# Patient Record
Sex: MALE | Race: WHITE | URBAN - METROPOLITAN AREA
[De-identification: names, ages, dates, MRNs, and addresses within clinical notes are randomized per-mention and may not be internally consistent; named-entity substitution may affect disease eponyms.]

---

## 2018-05-22 ENCOUNTER — TRANSFERRED RECORDS (OUTPATIENT)
Dept: HEALTH INFORMATION MANAGEMENT | Facility: CLINIC | Age: 48
End: 2018-05-22

## 2018-07-03 ENCOUNTER — TRANSFERRED RECORDS (OUTPATIENT)
Dept: HEALTH INFORMATION MANAGEMENT | Facility: CLINIC | Age: 48
End: 2018-07-03

## 2018-07-05 ENCOUNTER — TELEPHONE (OUTPATIENT)
Dept: UROLOGY | Facility: CLINIC | Age: 48
End: 2018-07-05

## 2018-07-05 DIAGNOSIS — N35.919 URETHRAL STRICTURE: Primary | ICD-10-CM

## 2018-07-05 NOTE — TELEPHONE ENCOUNTER
Placed orders for vcug and rug and sent to scheduling to schedule. Rocío Jimenez LPN Staff Nurse

## 2018-07-09 ENCOUNTER — PRE VISIT (OUTPATIENT)
Dept: UROLOGY | Facility: CLINIC | Age: 48
End: 2018-07-09

## 2018-07-09 NOTE — TELEPHONE ENCOUNTER
Patient with history of meatal stenosis coming in for consult. Patient scheduled for imaging prior. Patient chart reviewed, no need for call, all records available and ready for appointment.

## 2018-07-13 ENCOUNTER — HOSPITAL ENCOUNTER (OUTPATIENT)
Dept: GENERAL RADIOLOGY | Facility: CLINIC | Age: 48
End: 2018-07-13
Attending: UROLOGY
Payer: COMMERCIAL

## 2018-07-13 ENCOUNTER — HOSPITAL ENCOUNTER (OUTPATIENT)
Dept: GENERAL RADIOLOGY | Facility: CLINIC | Age: 48
Discharge: HOME | End: 2018-07-13
Attending: UROLOGY | Admitting: UROLOGY
Payer: COMMERCIAL

## 2018-07-13 ENCOUNTER — TELEPHONE (OUTPATIENT)
Dept: UROLOGY | Facility: CLINIC | Age: 48
End: 2018-07-13

## 2018-07-13 ENCOUNTER — OFFICE VISIT (OUTPATIENT)
Dept: UROLOGY | Facility: CLINIC | Age: 48
End: 2018-07-13
Payer: COMMERCIAL

## 2018-07-13 ENCOUNTER — ALLIED HEALTH/NURSE VISIT (OUTPATIENT)
Dept: UROLOGY | Facility: CLINIC | Age: 48
End: 2018-07-13
Payer: COMMERCIAL

## 2018-07-13 VITALS
DIASTOLIC BLOOD PRESSURE: 87 MMHG | HEART RATE: 73 BPM | WEIGHT: 260 LBS | BODY MASS INDEX: 38.51 KG/M2 | HEIGHT: 69 IN | SYSTOLIC BLOOD PRESSURE: 135 MMHG

## 2018-07-13 DIAGNOSIS — N35.013 POST-TRAUMATIC STRICTURE OF ANTERIOR URETHRA: Primary | ICD-10-CM

## 2018-07-13 DIAGNOSIS — L90.0 LICHEN SCLEROSUS: ICD-10-CM

## 2018-07-13 DIAGNOSIS — N35.919 URETHRAL STRICTURE: ICD-10-CM

## 2018-07-13 DIAGNOSIS — N35.919 URETHRAL STRICTURE: Primary | ICD-10-CM

## 2018-07-13 PROCEDURE — 51610 INJECTION FOR BLADDER X-RAY: CPT

## 2018-07-13 PROCEDURE — 25000125 ZZHC RX 250: Performed by: RADIOLOGY

## 2018-07-13 PROCEDURE — 74455 X-RAY URETHRA/BLADDER: CPT

## 2018-07-13 PROCEDURE — 25500064 ZZH RX 255 OP 636: Performed by: RADIOLOGY

## 2018-07-13 RX ORDER — SERTRALINE HYDROCHLORIDE 100 MG/1
150 TABLET, FILM COATED ORAL DAILY
COMMUNITY
Start: 2017-08-10

## 2018-07-13 RX ORDER — MORPHINE SULFATE 30 MG/1
30 TABLET, FILM COATED, EXTENDED RELEASE ORAL
COMMUNITY
Start: 2018-05-19

## 2018-07-13 RX ORDER — BETAMETHASONE DIPROPIONATE 0.5 MG/G
CREAM TOPICAL
COMMUNITY
Start: 2018-07-03

## 2018-07-13 RX ORDER — IOPAMIDOL 510 MG/ML
500 INJECTION, SOLUTION INTRAVASCULAR ONCE
Status: COMPLETED | OUTPATIENT
Start: 2018-07-13 | End: 2018-07-13

## 2018-07-13 RX ORDER — ALBUTEROL SULFATE 90 UG/1
2 AEROSOL, METERED RESPIRATORY (INHALATION)
COMMUNITY
Start: 2018-02-23

## 2018-07-13 RX ORDER — CEFAZOLIN SODIUM 1 G/50ML
1 INJECTION, SOLUTION INTRAVENOUS SEE ADMIN INSTRUCTIONS
Status: CANCELLED | OUTPATIENT
Start: 2018-07-13

## 2018-07-13 RX ORDER — TRAZODONE HYDROCHLORIDE 50 MG/1
50-100 TABLET, FILM COATED ORAL
COMMUNITY
Start: 2017-08-22

## 2018-07-13 RX ADMIN — LIDOCAINE HYDROCHLORIDE: 20 JELLY TOPICAL at 09:33

## 2018-07-13 RX ADMIN — IOPAMIDOL 150 ML: 510 INJECTION, SOLUTION INTRAVASCULAR at 09:32

## 2018-07-13 ASSESSMENT — ENCOUNTER SYMPTOMS
FLANK PAIN: 0
DIFFICULTY URINATING: 1
DYSURIA: 1
HEMATURIA: 0

## 2018-07-13 ASSESSMENT — PAIN SCALES - GENERAL: PAINLEVEL: NO PAIN (0)

## 2018-07-13 NOTE — PROGRESS NOTES
Urology Consult History and Physical    Name: Bakari White    MRN: [id number]   YOB: 1970         CHIEF COMPLAINT:  Urethral stricture.  HISTORY OF PRESENT ILLNESS:  Mr. White is a 48 year old-year-old man seen in consultation from Dr. Gutierrez for urethral stricture. Symptoms include slow stream and sense of incomplete emptying. He has had prior treatments for his urethral stricture. He underwent dilation and meatotomy 2-3 times. He has been dealing with this issue for 20-25 years.  He does have a history of self dilation. He currently does not perform self dilation. He does not currently have a catheter.  Etiology:  He denies a history of sexually transmitted diseases. He denies a history of straddle injury. He denies a history of pelvic fracture.   REVIEW OF QUESTIONNAIRES:  Questionnaires reviewed. See flowsheet for details.  Past Medical History:   Diagnosis Date     Pain management contract agreement      History reviewed. No pertinent surgical history.  Current Outpatient Prescriptions   Medication     albuterol (PROAIR HFA/PROVENTIL HFA/VENTOLIN HFA) 108 (90 Base) MCG/ACT Inhaler     augmented betamethasone dipropionate (DIPROLENE-AF) 0.05 % cream     morphine (MS CONTIN) 30 MG 12 hr tablet     sertraline (ZOLOFT) 100 MG tablet     tiZANidine (ZANAFLEX) 4 MG tablet     traZODone (DESYREL) 50 MG tablet     No current facility-administered medications for this visit.      Allergies as of 07/13/2018 - Denzel as Reviewed 07/13/2018   Allergen Reaction Noted     Etodolac  01/16/2012       History reviewed. No pertinent family history.  Social History     Social History     Marital status:      Spouse name: N/A     Number of children: N/A     Years of education: N/A     Occupational History     Not on file.     Social History Main Topics     Smoking status: Current Every Day Smoker     Smokeless tobacco: Never Used     Alcohol use Not on file     Drug use: Not on file     Sexual activity: Not  on file     Other Topics Concern     Not on file     Social History Narrative     No narrative on file     REVIEW OF SYSTEMS:    Skin: negative, negative for, as above, acne, rash, scaling, bruising, lumps or bumps, hair changes  Eyes: negative, negative for, double vision, glaucoma, cataracts, eye pain, color blindness, glasses, contacts, photophobia  Ears/Nose/Throat: negative, negative for, nasal congestion, purulent rhinorrhea, sneezing, postnasal drainage, hearing loss, deafness, tinnitus, vertigo, epistaxis, deviated septum  Respiratory: No shortness of breath, dyspnea on exertion, cough, or hemoptysis  Cardiovascular: negative, negative for, palpitations, tachycardia, chest pain, paroxysmal nocturnal dyspnea, orthopnea and lower extremity edema  Gastrointestinal: negative for, poor appetite, dysphagia, nausea, vomiting, heartburn, dyspepsia, reflux and hematemesis  Genitourinary: positive for  frequency, hesitancy and decreased urinary stream  Musculoskeletal: negative, negative for, fracture, back pain, neck pain, arthritis, joint swelling, joint stiffness, gout and fibromyalgia  Neurologic: negative, negative for, migraine headaches, headaches, syncope, stroke, seizures, paralysis, local weakness, numbness or tingling of hands and numbness or tingling of feet  Psychiatric: negative, negative for, sleep disturbance, feeling anxious, anxiety, nervous breakdown, depression, depression stable, thoughts of self-harm, thoughts of hurting someone else and agitation  Hematologic/Lymphatic/Immunologic: negative, negative for, as above, allergies, anemia, bleeding disorder, chills, fever and hay fever  Endocrine: negative, negative for, as above, thyroid disorder, goiter, cold intolerance, heat intolerance and hot flashes  The remainder of the 14-point review of systems is negative.  PHYSICAL EXAMINATION:  General:  Well-dressed, well-nourished man in no acute distress.  Vitals: /87  Pulse 73  Ht 1.753 m (5'  "9\")  Wt 117.9 kg (260 lb)  BMI 38.4 kg/m2 Estimated body mass index is 38.4 kg/(m^2) as calculated from the following:    Height as of this encounter: 1.753 m (5' 9\").    Weight as of this encounter: 117.9 kg (260 lb).    Eyes: anicteric, EOMI  Lymph: No cervical, supraclavicular or axillary lymphadenopathy  Lungs:  No respiratory distress.  Heart:  Regular rate and rhythm.     Abdomen:  mildly obese, soft, nontender, without masses.  There are not surgical scars.    :  Penis is circumcised. Meatal stenosis is present, penile plaques are absent. Skin lesions are present. There are some whitish plaques along the corona and the glans.  There is not firmness along the course of the entire urethra urethra.  Testes are 10 mL bilaterally without masses.    Musculoskeletal:  Motor strength is excellent throughout.     Neurologic:  Grossly nonfocal.    Back: Flanks are nontender.  REVIEW OF IMAGING STUDIES:  Retrograde urethrogram and voiding cystourethrogram were performed earlier and the images were independently interpreted by me and are reviewed with the patient.  These demonstrate a 7 cm  Distal and proximal penile urethral stricture that is not obliterative.  The bladder contour is normal without diverticulae.      ASSESSMENT/PLAN:  A 48 year old-year-old gentleman with penile and meatal urethral stricture refractory to minimally invasive management. On exam, this appears to be related to lichen sclerosis. He has been managed by prior urologists and is referred to our center for advanced urethral reconstruction options.  Risks, benefits, and alternatives of repeat urethrotomy versus urethroplasty were described.  He wishes to proceed with urethroplasty.  He understands the risks to include but not be limited to bleeding, infection, penile pain or numbness, scrotal pain or numbness, lower extremity neuropathy, change in erectile function, change in ejaculatory function, and need for additional procedures. He " understands the success rate of urethroplasty to be about 95% for anastomotic and 85% for augmentation procedures for bulbar urethral strictures.  I discussed a lower success rate in the setting of lichen sclerosis and a penile urethral stricture with this patient.  Additional Risk Factors in this case/surgery: Lichen sclerosis. Length of stricture. Obesity. History of self catheterization. Location of stricture.  I discussed with him the possible approaches for this patient which include 1) dilation and CIC to keep it open 2) perineal urethrostomy and 3) urethroplasty. He is thoughtful and given that he has not had prior surgical reconstruction, he desires urethroplasty. I discussed this could be done in a one stage (dorsal onlay buccal) or two stage fashion. I discussed that I would recommend a one stage approach for decreased morbidity (no need for 6 months in between stages) and some studies suggesting improved outcomes.   We offered urethroplasty. We would perform this using a penile skin incision in a dorsal onlay fashion. We discussed that he would need a suprapubic tube, which would be placed at the time of urethroplasty. His urethral yi would be removed in 10-14 days. Then at 3 weeks, a VCUG would be performed. If it looked good, then his SPT would be removed at that time.  He does have a history of chronic neck pain and takes morphine daily.  Orders placed.  Thank for the opportunity to care for this patient.  Deep Gloria MD  Genitourinary Reconstructive Urology

## 2018-07-13 NOTE — PROGRESS NOTES
Pre Op Teaching Flowsheet       Pre and Post op Patient Education  Relevant Diagnosis:  Post-traumatic stricture of anterior urethra   Teaching Topic:  Pre and post op teaching for Urethroplasty with Single Buccal Graft. Possible Double Buccal Graft  Person Involved in teaching:  Bakari White      Motivation Level:  Asks Questions: Yes  Eager to Learn:  Yes  Cooperative: Yes  Receptive (willing/able to accept information):  Yes  Patient demonstrates understanding of the following:  Date and time of surgery:  8/16/18 at 0730  Location of surgery: [address] [place] [address]  History and Physical and any other testing necessary prior to surgery: Yes  Required time line for completion of History and Physical and any pre-op testing: Yes    NPO Guidelines: NPO per Anesthesia Guidelines    Patient demonstrates understanding of the following:  Patient understands the need for a responsible adult to drive them home and someone to stay with them for the first 24 hours post-operatively: YES   Pre-op bowel prep: No, not needed  Pre-op showering/scrub information with Hibiclens Soap: Yes  Medications to take the day of surgery:  Per PCP  Blood thinner medications discussed and when to stop (if applicable):  Yes  Diabetes medication management (if applicable):  Patient to discuss with Primary Care Provider  Discussed pain control after surgery: pain scale, pain medications and pain management techniques  Infection Prevention: Patient demonstrates understanding of the following:  Patient instructed on hand hygiene:  Yes  Surgical procedure site care taught: Yes  Signs and symptoms of infection taught:  Yes  Wound care will be taught at the time of discharge.  Central venous catheter care will be taught at the time of discharge (if applicable).    Post-op follow-up:  Discussed how to contact the hospital, nurse, and clinic scheduling staff if necessary.    Instructional materials used/given/mailed:  [facility] Booklet, post op  teaching sheet, Map, Soap, and arrival/location information.    Surgical instructions given to patient in clinic: Yes.    Instructional Materials given:  Before your surgery packet , Medications to avoid before surgery , Showering or Bathing instructions before surgery  and What to expect after surgery    Post-op appointment/testing scheduled per MD orders: 9/7/18 with Dr. Gloria, VCUG prior    Total time with patient: 10 minutes    Aviva Mckeon RN  Urology Care Coordinator

## 2018-07-13 NOTE — MR AVS SNAPSHOT
After Visit Summary   7/13/2018    Bakari White    MRN: [id number]           Patient Information     Date Of Birth          1970        Visit Information        Provider Department      7/13/2018 11:30 AM Deep Gloria MD [facility] for Prostate and Urologic Cancers        Today's Diagnoses     Post-traumatic stricture of anterior urethra    -  1    Lichen sclerosus           Follow-ups after your visit        Your next 10 appointments already scheduled     Aug 16, 2018   Procedure with Deep Gloria MD   [facility] [place], Same Day Surgery (--)    [address] [address] [phone]            Sep 07, 2018  2:00 PM CDT   XR CYSTOGRAM VOIDING with UCXR2, UC GIGU RAD   [facility] Imaging [place] [facility] ([facility])    [address] [facility] [address] [phone]           Please bring a list of your current medicines to your exam. (Include vitamins, minerals and over-thecounter medicines.) Leave your valuables at home.  Tell your doctor if there is a chance you may be pregnant.  You do not need to do anything special for this exam.            Sep 07, 2018  3:00 PM CDT   (Arrive by 2:45 PM)   Post-Op with Deep Gloria MD   [facility] Inst for Prostate and Urologic Cancers ([facility])    [address]  [address] [facility] [address] [phone]              Future tests that were ordered for you today     Open Future Orders        Priority Expected Expires Ordered    X-Ray Voiding cystogram Routine 7/13/2018 7/13/2019 7/13/2018            Who to contact     Please call your clinic at [phone] to:    Ask questions about your health    Make or cancel appointments    Discuss your medicines    Learn about your test results    Speak to your doctor            Additional Information About Your Visit        MyChart Information     [employer] gives you secure access to your  "electronic health record. If you see a primary care provider, you can also send messages to your care team and make appointments. If you have questions, please call your primary care clinic.  If you do not have a primary care provider, please call [phone] and they will assist you.      [employer] is an electronic gateway that provides easy, online access to your medical records. With [employer], you can request a clinic appointment, read your test results, renew a prescription or communicate with your care team.     To access your existing account, please contact your [facility] or call [phone] for assistance.        Care EveryWhere ID     This is your Care EveryWhere ID. This could be used by other organizations to access your [facility] medical records  [id number]        Your Vitals Were     Pulse Height BMI (Body Mass Index)             73 1.753 m (5' 9\") 38.4 kg/m2          Blood Pressure from Last 3 Encounters:   07/13/18 135/87    Weight from Last 3 Encounters:   07/13/18 117.9 kg (260 lb)              We Performed the Following     Page-Operative Worksheet  (Urology General)       Information about OPIOIDS     PRESCRIPTION OPIOIDS: WHAT YOU NEED TO KNOW   We gave you an opioid (narcotic) pain medicine. It is important to manage your pain, but opioids are not always the best choice. You should first try all the other options your care team gave you. Take this medicine for as short a time (and as few doses) as possible.     These medicines have risks:    DO NOT drive when on new or higher doses of pain medicine. These medicines can affect your alertness and reaction times, and you could be arrested for driving under the influence (DUI). If you need to use opioids long-term, talk to your care team about driving.    DO NOT operate heave machinery    DO NOT do any other dangerous activities while taking these medicines.     DO NOT drink any alcohol while taking these medicines.  "     If the opioid prescribed includes acetaminophen, DO NOT take with any other medicines that contain acetaminophen. Read all labels carefully. Look for the word  acetaminophen  or  Tylenol.  Ask your pharmacist if you have questions or are unsure.    You can get addicted to pain medicines, especially if you have a history of addiction (chemical, alcohol or substance dependence). Talk to your care team about ways to reduce this risk.    Store your pills in a secure place, locked if possible. We will not replace any lost or stolen medicine. If you don t finish your medicine, please throw away (dispose) as directed by your pharmacist. The [place] Pollution Control Agency has more information about safe disposal: https://www.pca.[facility].[place].us/living-green/managing-unwanted-medications.     All opioids tend to cause constipation. Drink plenty of water and eat foods that have a lot of fiber, such as fruits, vegetables, prune juice, apple juice and high-fiber cereal. Take a laxative (Miralax, milk of magnesia, Colace, Senna) if you don t move your bowels at least every other day.          Primary Care Provider Office Phone # Fax #    Milad Lozano [phone]       [facility] [address] [facility] [address]        Equal Access to Services     ROHINI VARNER : Hadii carmel cubao Sonathalie, waaxda luqadaha, qaybta kaalmada adeegyada, keli cancino. So [facility] [phone].    ATENCIÓN: Si habla español, tiene a brown disposición servicios gratuitos de asistencia lingüística. Llame al [phone].    We comply with applicable federal civil rights laws and [place] laws. We do not discriminate on the basis of race, color, national origin, age, disability, sex, sexual orientation, or gender identity.            Thank you!     Thank you for choosing [facility] UROLOGY AND [facility] FOR PROSTATE AND UROLOGIC CANCERS  for your care. Our goal is always to provide you with excellent  care. Hearing back from our patients is one way we can continue to improve our services. Please take a few minutes to complete the written survey that you may receive in the mail after your visit with us. Thank you!             Your Updated Medication List - Protect others around you: Learn how to safely use, store and throw away your medicines at www.disposemymeds.org.          This list is accurate as of 7/13/18  3:28 PM.  Always use your most recent med list.                   Brand Name Dispense Instructions for use Diagnosis    albuterol 108 (90 Base) MCG/ACT Inhaler    PROAIR HFA/PROVENTIL HFA/VENTOLIN HFA     Inhale 2 puffs into the lungs        augmented betamethasone dipropionate 0.05 % cream    DIPROLENE-AF          morphine 30 MG 12 hr tablet    MS CONTIN     Take 30 mg by mouth        sertraline 100 MG tablet    ZOLOFT     Take 150 mg by mouth        tiZANidine 4 MG tablet    ZANAFLEX     Take 2 tablets as needed during the day and 1 tablet at bedtime        traZODone 50 MG tablet    DESYREL     Take  mg by mouth

## 2018-07-13 NOTE — LETTER
7/13/2018       RE: Bakari White  [address] [facility] [address]     Dear Colleague,    Thank you for referring your patient, Bakari White, to the [facility] UROLOGY AND INST FOR PROSTATE AND UROLOGIC CANCERS at [facility]. Please see a copy of my visit note below.    Urology Consult History and Physical    Name: Bakari White    MRN: [id number]   YOB: 1970         CHIEF COMPLAINT:  Urethral stricture.  HISTORY OF PRESENT ILLNESS:  Mr. White is a 48 year old-year-old man seen in consultation from Dr. Gutierrez for urethral stricture. Symptoms include slow stream and sense of incomplete emptying. He has had prior treatments for his urethral stricture. He underwent dilation and meatotomy 2-3 times. He has been dealing with this issue for 20-25 years.  He does have a history of self dilation. He currently does not perform self dilation. He does not currently have a catheter.  Etiology:  He denies a history of sexually transmitted diseases. He denies a history of straddle injury. He denies a history of pelvic fracture.   REVIEW OF QUESTIONNAIRES:  Questionnaires reviewed. See flowsheet for details.  Past Medical History:   Diagnosis Date     Pain management contract agreement      History reviewed. No pertinent surgical history.  Current Outpatient Prescriptions   Medication     albuterol (PROAIR HFA/PROVENTIL HFA/VENTOLIN HFA) 108 (90 Base) MCG/ACT Inhaler     augmented betamethasone dipropionate (DIPROLENE-AF) 0.05 % cream     morphine (MS CONTIN) 30 MG 12 hr tablet     sertraline (ZOLOFT) 100 MG tablet     tiZANidine (ZANAFLEX) 4 MG tablet     traZODone (DESYREL) 50 MG tablet     No current facility-administered medications for this visit.      Allergies as of 07/13/2018 - Denzel as Reviewed 07/13/2018   Allergen Reaction Noted     Etodolac  01/16/2012       History reviewed. No pertinent family history.  Social History     Social History     Marital status:       Spouse name: N/A     Number of children: N/A     Years of education: N/A     Occupational History     Not on file.     Social History Main Topics     Smoking status: Current Every Day Smoker     Smokeless tobacco: Never Used     Alcohol use Not on file     Drug use: Not on file     Sexual activity: Not on file     Other Topics Concern     Not on file     Social History Narrative     No narrative on file     REVIEW OF SYSTEMS:    Skin: negative, negative for, as above, acne, rash, scaling, bruising, lumps or bumps, hair changes  Eyes: negative, negative for, double vision, glaucoma, cataracts, eye pain, color blindness, glasses, contacts, photophobia  Ears/Nose/Throat: negative, negative for, nasal congestion, purulent rhinorrhea, sneezing, postnasal drainage, hearing loss, deafness, tinnitus, vertigo, epistaxis, deviated septum  Respiratory: No shortness of breath, dyspnea on exertion, cough, or hemoptysis  Cardiovascular: negative, negative for, palpitations, tachycardia, chest pain, paroxysmal nocturnal dyspnea, orthopnea and lower extremity edema  Gastrointestinal: negative for, poor appetite, dysphagia, nausea, vomiting, heartburn, dyspepsia, reflux and hematemesis  Genitourinary: positive for  frequency, hesitancy and decreased urinary stream  Musculoskeletal: negative, negative for, fracture, back pain, neck pain, arthritis, joint swelling, joint stiffness, gout and fibromyalgia  Neurologic: negative, negative for, migraine headaches, headaches, syncope, stroke, seizures, paralysis, local weakness, numbness or tingling of hands and numbness or tingling of feet  Psychiatric: negative, negative for, sleep disturbance, feeling anxious, anxiety, nervous breakdown, depression, depression stable, thoughts of self-harm, thoughts of hurting someone else and agitation  Hematologic/Lymphatic/Immunologic: negative, negative for, as above, allergies, anemia, bleeding disorder, chills, fever and hay fever  Endocrine:  "negative, negative for, as above, thyroid disorder, goiter, cold intolerance, heat intolerance and hot flashes  The remainder of the 14-point review of systems is negative.  PHYSICAL EXAMINATION:  General:  Well-dressed, well-nourished man in no acute distress.  Vitals: /87  Pulse 73  Ht 1.753 m (5' 9\")  Wt 117.9 kg (260 lb)  BMI 38.4 kg/m2 Estimated body mass index is 38.4 kg/(m^2) as calculated from the following:    Height as of this encounter: 1.753 m (5' 9\").    Weight as of this encounter: 117.9 kg (260 lb).    Eyes: anicteric, EOMI  Lymph: No cervical, supraclavicular or axillary lymphadenopathy  Lungs:  No respiratory distress.  Heart:  Regular rate and rhythm.     Abdomen:  mildly obese, soft, nontender, without masses.  There are not surgical scars.    :  Penis is circumcised. Meatal stenosis is present, penile plaques are absent. Skin lesions are present. There are some whitish plaques along the corona and the glans.  There is not firmness along the course of the entire urethra urethra.  Testes are 10 mL bilaterally without masses.    Musculoskeletal:  Motor strength is excellent throughout.     Neurologic:  Grossly nonfocal.    Back: Flanks are nontender.  REVIEW OF IMAGING STUDIES:  Retrograde urethrogram and voiding cystourethrogram were performed earlier and the images were independently interpreted by me and are reviewed with the patient.  These demonstrate a 7 cm  Distal and proximal penile urethral stricture that is not obliterative.  The bladder contour is normal without diverticulae.      ASSESSMENT/PLAN:  A 48 year old-year-old gentleman with penile and meatal urethral stricture refractory to minimally invasive management. On exam, this appears to be related to lichen sclerosis. He has been managed by prior urologists and is referred to our center for advanced urethral reconstruction options.  Risks, benefits, and alternatives of repeat urethrotomy versus urethroplasty were " described.  He wishes to proceed with urethroplasty.  He understands the risks to include but not be limited to bleeding, infection, penile pain or numbness, scrotal pain or numbness, lower extremity neuropathy, change in erectile function, change in ejaculatory function, and need for additional procedures. He understands the success rate of urethroplasty to be about 95% for anastomotic and 85% for augmentation procedures for bulbar urethral strictures.  I discussed a lower success rate in the setting of lichen sclerosis and a penile urethral stricture with this patient.  Additional Risk Factors in this case/surgery: Lichen sclerosis. Length of stricture. Obesity. History of self catheterization. Location of stricture.  I discussed with him the possible approaches for this patient which include 1) dilation and CIC to keep it open 2) perineal urethrostomy and 3) urethroplasty. He is thoughtful and given that he has not had prior surgical reconstruction, he desires urethroplasty. I discussed this could be done in a one stage (dorsal onlay buccal) or two stage fashion. I discussed that I would recommend a one stage approach for decreased morbidity (no need for 6 months in between stages) and some studies suggesting improved outcomes.   We offered urethroplasty. We would perform this using a penile skin incision in a dorsal onlay fashion. We discussed that he would need a suprapubic tube, which would be placed at the time of urethroplasty. His urethral yi would be removed in 10-14 days. Then at 3 weeks, a VCUG would be performed. If it looked good, then his SPT would be removed at that time.  He does have a history of chronic neck pain and takes morphine daily.  Orders placed.  Thank for the opportunity to care for this patient.  Deep Gloria MD  Genitourinary Reconstructive Urology

## 2018-07-13 NOTE — MR AVS SNAPSHOT
After Visit Summary   7/13/2018    Bakari White    MRN: [id number]           Patient Information     Date Of Birth          1970        Visit Information        Provider Department      7/13/2018 12:30 PM Nurse, [facility] Urology and Inst for Prostate and Urologic Cancers        Today's Diagnoses     Post-traumatic stricture of anterior urethra    -  1       Follow-ups after your visit        Your next 10 appointments already scheduled     Aug 16, 2018   Procedure with Deep Gloria MD   [facility], [place], Same Day Surgery (--)    [address] [facility] [id number] [phone]            Sep 07, 2018  2:00 PM CDT   XR CYSTOGRAM VOIDING with UCXR2, JOSHUA GIGU RAD   [facility] Xray ([facility])    [address] [place] [address] [phone]           Please bring a list of your current medicines to your exam. (Include vitamins, minerals and over-thecounter medicines.) Leave your valuables at home.  Tell your doctor if there is a chance you may be pregnant.  You do not need to do anything special for this exam.            Sep 07, 2018  3:00 PM CDT   (Arrive by 2:45 PM)   Post-Op with Deep Gloria MD   [facility] Urology and Inst for Prostate and Urologic Cancers ([facility])    [address] [place] [address]  [address] [facility] [address] [phone]              Future tests that were ordered for you today     Open Future Orders        Priority Expected Expires Ordered    X-Ray Voiding cystogram Routine 7/13/2018 7/13/2019 7/13/2018            Who to contact     Please call your clinic at [phone] to:    Ask questions about your health    Make or cancel appointments    Discuss your medicines    Learn about your test results    Speak to your doctor            Additional Information About Your Visit        [facility] Information     [employer] gives you secure access to your electronic  health record. If you see a primary care provider, you can also send messages to your care team and make appointments. If you have questions, please call your primary care clinic.  If you do not have a primary care provider, please call [phone] and they will assist you.      [employer] is an electronic gateway that provides easy, online access to your medical records. With [employer], you can request a clinic appointment, read your test results, renew a prescription or communicate with your care team.     To access your existing account, please contact your [facility] or call [phone] for assistance.        Care EveryWhere ID     This is your Care EveryWhere ID. This could be used by other organizations to access your [place] medical records  [id number]         Blood Pressure from Last 3 Encounters:   07/13/18 135/87    Weight from Last 3 Encounters:   07/13/18 117.9 kg (260 lb)              Today, you had the following     No orders found for display       Primary Care Provider Office Phone # Fax #    Milad OLIVEIRA Blake [phone]       [facility] [address] [facility] [address]        Equal Access to Services     ROHINI VARNER : Hadii aad ku hadasho Soomaali, waaxda luqadaha, qaybta kaalmada adeegyada, keli yin . So [facility] [phone].    ATENCIÓN: Si habla español, tiene a brown disposición servicios gratuitos de asistencia lingüística. Llame al [phone].    We comply with applicable federal civil rights laws and [place] laws. We do not discriminate on the basis of race, color, national origin, age, disability, sex, sexual orientation, or gender identity.            Thank you!     Thank you for choosing [facility] UROLOGY AND [facility] FOR PROSTATE AND UROLOGIC CANCERS  for your care. Our goal is always to provide you with excellent care. Hearing back from our patients is one way we can continue to improve our services. Please  take a few minutes to complete the written survey that you may receive in the mail after your visit with us. Thank you!             Your Updated Medication List - Protect others around you: Learn how to safely use, store and throw away your medicines at www.disposemymeds.org.          This list is accurate as of 7/13/18 12:47 PM.  Always use your most recent med list.                   Brand Name Dispense Instructions for use Diagnosis    albuterol 108 (90 Base) MCG/ACT Inhaler    PROAIR HFA/PROVENTIL HFA/VENTOLIN HFA     Inhale 2 puffs into the lungs        augmented betamethasone dipropionate 0.05 % cream    DIPROLENE-AF          morphine 30 MG 12 hr tablet    MS CONTIN     Take 30 mg by mouth        sertraline 100 MG tablet    ZOLOFT     Take 150 mg by mouth        tiZANidine 4 MG tablet    ZANAFLEX     Take 2 tablets as needed during the day and 1 tablet at bedtime        traZODone 50 MG tablet    DESYREL     Take  mg by mouth

## 2018-07-13 NOTE — TELEPHONE ENCOUNTER
Surgery scheduled on 8/16/18 @ 7:30 am @ [place].  Surgery packet given in clinic during surgery teaching with RN care coordinator Abiel

## 2018-08-07 PROBLEM — N35.919 URETHRAL STRICTURE: Status: ACTIVE | Noted: 2018-08-07

## 2018-08-09 ENCOUNTER — TRANSFERRED RECORDS (OUTPATIENT)
Dept: HEALTH INFORMATION MANAGEMENT | Facility: CLINIC | Age: 48
End: 2018-08-09

## 2018-08-14 DIAGNOSIS — N39.0 URINARY TRACT INFECTION: Primary | ICD-10-CM

## 2018-08-14 RX ORDER — CIPROFLOXACIN 500 MG/1
500 TABLET, FILM COATED ORAL 2 TIMES DAILY
Qty: 6 TABLET | Refills: 0 | Status: ON HOLD | OUTPATIENT
Start: 2018-08-14 | End: 2018-08-17

## 2018-08-15 ENCOUNTER — ANESTHESIA EVENT (OUTPATIENT)
Dept: SURGERY | Facility: CLINIC | Age: 48
End: 2018-08-15
Payer: COMMERCIAL

## 2018-08-15 NOTE — OR NURSING
Reviewing pt's chart for his upcoming surgery tomorrow. His UC report on 8/12 says >100,000 Aerococcus Urinae. I called [facility] RN care coordinator for Dr. Gloria. She said Dr Gloria is aware and that they started the pt on Cipro yesterday.

## 2018-08-15 NOTE — ANESTHESIA PREPROCEDURE EVALUATION
"ANESTHESIA PREOP EVALUATION    NPO Status:      Procedure: Urethroplasty with buccal mucosa flap    HPI: Mr. White is a 48 year old-year-old man seen in consultation from Dr. Gutierrez for urethral stricture. Symptoms include slow stream and sense of incomplete emptying. He has had prior treatments for his urethral stricture. He underwent dilation and meatotomy 2-3 times. He has been dealing with this issue for 20-25 years.  He does have a history of self dilation. He currently does not perform self dilation. He does not currently have a catheter.    PMHx/PSHx/ROS:  PAST MEDICAL HISTORY:   Past Medical History:   Diagnosis Date     Pain management contract agreement        PAST SURGICAL HISTORY: History reviewed. No pertinent surgical history.  NOTE = previous C3-5 neck fusion  FAMILY HISTORY: History reviewed. No pertinent family history.      Past Anes Hx: No personal or family h/o anesthesia problems  ROS = patient reports residual bilateral \"tingling\" in hands due to neck surgery, R > L.  This is especially prominent in the right ulnar n distribution. Minimal weakness.   Soc Hx:   Tobacco:   EtOH:     Allergies:   Allergies   Allergen Reactions     Etodolac      STRANGE DREAMS       Meds:   No prescriptions prior to admission.       Current Outpatient Prescriptions   Medication Sig Dispense Refill     albuterol (PROAIR HFA/PROVENTIL HFA/VENTOLIN HFA) 108 (90 Base) MCG/ACT Inhaler Inhale 2 puffs into the lungs       augmented betamethasone dipropionate (DIPROLENE-AF) 0.05 % cream        ciprofloxacin (CIPRO) 500 MG tablet Take 1 tablet (500 mg) by mouth 2 times daily for 3 days 6 tablet 0     morphine (MS CONTIN) 30 MG 12 hr tablet Take 30 mg by mouth       sertraline (ZOLOFT) 100 MG tablet Take 150 mg by mouth daily        TIZANIDINE HCL PO        traZODone (DESYREL) 50 MG tablet Take  mg by mouth         Physical Exam:  VS: T Data Unavailable, P Data Unavailable, BP Data Unavailable, R Data Unavailable, " SpO2       Airway: MP , TM>3FB, Neck full ROM  Dentition: no loose teeth  Heart: RRR  Lungs: CTAB    BMP:  No results found for: NA   No results found for: POTASSIUM  No results found for: CHLORIDE  No results found for: CHELLY  No results found for: CO2  No results found for: BUN  No results found for: CR  No results found for: GLC     CBC:  pending  No results found for: WBC  No results found for: HGB  No results found for: HCT  No results found for: PLT     Coags/Type and Screen  No results found for: INR  No results found for: PT  Type and Screen:      Assessment/Plan:  - ASA 2  - GETA with standard ASA monitors, IV induction, balanced anesthetic  - PIV, possible a-Line  - Antibiotics per surgery  - PONV prophylaxis  - Blood products available, possible administration discussed with patient    Pawan Chin MD    8/15/2018  2:52 PM                        Anesthesia Plan      History & Physical Review  History and physical reviewed and following examination; no interval change.    ASA Status:  2 .    NPO Status:  > 4 hours    Plan for General and ETT with Intravenous induction. Maintenance will be Balanced.    PONV prophylaxis:  Ondansetron (or other 5HT-3) and Dexamethasone or Solumedrol  Additional equipment: 2nd IV History and physical assessed; Patient examined.  I have reviewed and agree with this pre-op assessment and anesthetic plan.  Patient and family also agree.   Risks and alternatives presented and discussed.   AQA.  -rcp        Postoperative Care  Postoperative pain management:  IV analgesics and Multi-modal analgesia.      Consents  Anesthetic plan, risks, benefits and alternatives discussed with:  Patient.  Use of blood products discussed: No .   .                          .

## 2018-08-16 ENCOUNTER — HOSPITAL ENCOUNTER (OUTPATIENT)
Facility: CLINIC | Age: 48
Discharge: HOME | End: 2018-08-17
Attending: UROLOGY | Admitting: UROLOGY
Payer: COMMERCIAL

## 2018-08-16 ENCOUNTER — SURGERY (OUTPATIENT)
Age: 48
End: 2018-08-16

## 2018-08-16 ENCOUNTER — ANESTHESIA (OUTPATIENT)
Dept: SURGERY | Facility: CLINIC | Age: 48
End: 2018-08-16
Payer: COMMERCIAL

## 2018-08-16 DIAGNOSIS — G89.18 ACUTE POST-OPERATIVE PAIN: ICD-10-CM

## 2018-08-16 DIAGNOSIS — N39.0 URINARY TRACT INFECTION: ICD-10-CM

## 2018-08-16 DIAGNOSIS — N35.9 URETHRAL STRICTURE, UNSPECIFIED STRICTURE TYPE: Primary | ICD-10-CM

## 2018-08-16 LAB — GLUCOSE BLDC GLUCOMTR-MCNC: 87 MG/DL (ref 70–99)

## 2018-08-16 PROCEDURE — 37000009 ZZH ANESTHESIA TECHNICAL FEE, EACH ADDTL 15 MIN: Performed by: UROLOGY

## 2018-08-16 PROCEDURE — 71000015 ZZH RECOVERY PHASE 1 LEVEL 2 EA ADDTL HR: Performed by: UROLOGY

## 2018-08-16 PROCEDURE — 25000128 H RX IP 250 OP 636: Performed by: UROLOGY

## 2018-08-16 PROCEDURE — C9399 UNCLASSIFIED DRUGS OR BIOLOG: HCPCS | Performed by: NURSE ANESTHETIST, CERTIFIED REGISTERED

## 2018-08-16 PROCEDURE — 82962 GLUCOSE BLOOD TEST: CPT

## 2018-08-16 PROCEDURE — 36000053 ZZH SURGERY LEVEL 2 EA 15 ADDTL MIN - UMMC: Performed by: UROLOGY

## 2018-08-16 PROCEDURE — 36000051 ZZH SURGERY LEVEL 2 1ST 30 MIN - UMMC: Performed by: UROLOGY

## 2018-08-16 PROCEDURE — 25000128 H RX IP 250 OP 636: Performed by: NURSE ANESTHETIST, CERTIFIED REGISTERED

## 2018-08-16 PROCEDURE — 27211024 ZZHC OR SUPPLY OTHER OPNP: Performed by: UROLOGY

## 2018-08-16 PROCEDURE — 25000132 ZZH RX MED GY IP 250 OP 250 PS 637: Performed by: UROLOGY

## 2018-08-16 PROCEDURE — 25000128 H RX IP 250 OP 636: Performed by: ANESTHESIOLOGY

## 2018-08-16 PROCEDURE — 25000125 ZZHC RX 250: Performed by: UROLOGY

## 2018-08-16 PROCEDURE — 37000008 ZZH ANESTHESIA TECHNICAL FEE, 1ST 30 MIN: Performed by: UROLOGY

## 2018-08-16 PROCEDURE — 25000132 ZZH RX MED GY IP 250 OP 250 PS 637: Performed by: ANESTHESIOLOGY

## 2018-08-16 PROCEDURE — 40000170 ZZH STATISTIC PRE-PROCEDURE ASSESSMENT II: Performed by: UROLOGY

## 2018-08-16 PROCEDURE — 27210794 ZZH OR GENERAL SUPPLY STERILE: Performed by: UROLOGY

## 2018-08-16 PROCEDURE — C2627 CATH, SUPRAPUBIC/CYSTOSCOPIC: HCPCS | Performed by: UROLOGY

## 2018-08-16 PROCEDURE — C1769 GUIDE WIRE: HCPCS | Performed by: UROLOGY

## 2018-08-16 PROCEDURE — 25000565 ZZH ISOFLURANE, EA 15 MIN: Performed by: UROLOGY

## 2018-08-16 PROCEDURE — 25000125 ZZHC RX 250: Performed by: NURSE ANESTHETIST, CERTIFIED REGISTERED

## 2018-08-16 PROCEDURE — 71000014 ZZH RECOVERY PHASE 1 LEVEL 2 FIRST HR: Performed by: UROLOGY

## 2018-08-16 RX ORDER — SODIUM CHLORIDE, SODIUM LACTATE, POTASSIUM CHLORIDE, CALCIUM CHLORIDE 600; 310; 30; 20 MG/100ML; MG/100ML; MG/100ML; MG/100ML
INJECTION, SOLUTION INTRAVENOUS CONTINUOUS
Status: DISCONTINUED | OUTPATIENT
Start: 2018-08-16 | End: 2018-08-16 | Stop reason: HOSPADM

## 2018-08-16 RX ORDER — DEXAMETHASONE SODIUM PHOSPHATE 4 MG/ML
4 INJECTION, SOLUTION INTRA-ARTICULAR; INTRALESIONAL; INTRAMUSCULAR; INTRAVENOUS; SOFT TISSUE
Status: DISCONTINUED | OUTPATIENT
Start: 2018-08-16 | End: 2018-08-16 | Stop reason: HOSPADM

## 2018-08-16 RX ORDER — SODIUM CHLORIDE 9 MG/ML
INJECTION, SOLUTION INTRAVENOUS CONTINUOUS
Status: DISCONTINUED | OUTPATIENT
Start: 2018-08-16 | End: 2018-08-17 | Stop reason: CLARIF

## 2018-08-16 RX ORDER — TOLTERODINE 4 MG/1
4 CAPSULE, EXTENDED RELEASE ORAL DAILY
Status: DISCONTINUED | OUTPATIENT
Start: 2018-08-16 | End: 2018-08-17 | Stop reason: HOSPADM

## 2018-08-16 RX ORDER — CHLORHEXIDINE GLUCONATE ORAL RINSE 1.2 MG/ML
SOLUTION DENTAL PRN
Status: DISCONTINUED | OUTPATIENT
Start: 2018-08-16 | End: 2018-08-16

## 2018-08-16 RX ORDER — ONDANSETRON 2 MG/ML
4 INJECTION INTRAMUSCULAR; INTRAVENOUS EVERY 30 MIN PRN
Status: DISCONTINUED | OUTPATIENT
Start: 2018-08-16 | End: 2018-08-16 | Stop reason: HOSPADM

## 2018-08-16 RX ORDER — LIDOCAINE 40 MG/G
CREAM TOPICAL
Status: DISCONTINUED | OUTPATIENT
Start: 2018-08-16 | End: 2018-08-17 | Stop reason: HOSPADM

## 2018-08-16 RX ORDER — PROPOFOL 10 MG/ML
INJECTION, EMULSION INTRAVENOUS PRN
Status: DISCONTINUED | OUTPATIENT
Start: 2018-08-16 | End: 2018-08-16

## 2018-08-16 RX ORDER — PROCHLORPERAZINE MALEATE 5 MG
10 TABLET ORAL EVERY 6 HOURS PRN
Status: DISCONTINUED | OUTPATIENT
Start: 2018-08-16 | End: 2018-08-17 | Stop reason: HOSPADM

## 2018-08-16 RX ORDER — LABETALOL HYDROCHLORIDE 5 MG/ML
10 INJECTION, SOLUTION INTRAVENOUS
Status: DISCONTINUED | OUTPATIENT
Start: 2018-08-16 | End: 2018-08-16 | Stop reason: HOSPADM

## 2018-08-16 RX ORDER — ACETAMINOPHEN 325 MG/1
975 TABLET ORAL ONCE
Status: COMPLETED | OUTPATIENT
Start: 2018-08-16 | End: 2018-08-16

## 2018-08-16 RX ORDER — TRAZODONE HYDROCHLORIDE 100 MG/1
100 TABLET ORAL AT BEDTIME
Status: DISCONTINUED | OUTPATIENT
Start: 2018-08-16 | End: 2018-08-17 | Stop reason: HOSPADM

## 2018-08-16 RX ORDER — FENTANYL CITRATE 50 UG/ML
25-50 INJECTION, SOLUTION INTRAMUSCULAR; INTRAVENOUS
Status: DISCONTINUED | OUTPATIENT
Start: 2018-08-16 | End: 2018-08-16 | Stop reason: HOSPADM

## 2018-08-16 RX ORDER — HYDROMORPHONE HYDROCHLORIDE 1 MG/ML
.3-.5 INJECTION, SOLUTION INTRAMUSCULAR; INTRAVENOUS; SUBCUTANEOUS
Status: DISCONTINUED | OUTPATIENT
Start: 2018-08-16 | End: 2018-08-17 | Stop reason: HOSPADM

## 2018-08-16 RX ORDER — GABAPENTIN 300 MG/1
300 CAPSULE ORAL ONCE
Status: DISCONTINUED | OUTPATIENT
Start: 2018-08-16 | End: 2018-08-16

## 2018-08-16 RX ORDER — ACETAMINOPHEN 325 MG/1
650 TABLET ORAL EVERY 6 HOURS PRN
Status: DISCONTINUED | OUTPATIENT
Start: 2018-08-16 | End: 2018-08-17 | Stop reason: HOSPADM

## 2018-08-16 RX ORDER — DEXAMETHASONE SODIUM PHOSPHATE 4 MG/ML
INJECTION, SOLUTION INTRA-ARTICULAR; INTRALESIONAL; INTRAMUSCULAR; INTRAVENOUS; SOFT TISSUE PRN
Status: DISCONTINUED | OUTPATIENT
Start: 2018-08-16 | End: 2018-08-16

## 2018-08-16 RX ORDER — OXYCODONE HYDROCHLORIDE 5 MG/1
5-10 TABLET ORAL
Status: DISCONTINUED | OUTPATIENT
Start: 2018-08-16 | End: 2018-08-17 | Stop reason: HOSPADM

## 2018-08-16 RX ORDER — NALOXONE HYDROCHLORIDE 0.4 MG/ML
.1-.4 INJECTION, SOLUTION INTRAMUSCULAR; INTRAVENOUS; SUBCUTANEOUS
Status: DISCONTINUED | OUTPATIENT
Start: 2018-08-16 | End: 2018-08-17 | Stop reason: HOSPADM

## 2018-08-16 RX ORDER — ALBUTEROL SULFATE 90 UG/1
2 AEROSOL, METERED RESPIRATORY (INHALATION) EVERY 4 HOURS PRN
Status: DISCONTINUED | OUTPATIENT
Start: 2018-08-16 | End: 2018-08-17 | Stop reason: HOSPADM

## 2018-08-16 RX ORDER — ONDANSETRON 4 MG/1
4 TABLET, ORALLY DISINTEGRATING ORAL EVERY 30 MIN PRN
Status: DISCONTINUED | OUTPATIENT
Start: 2018-08-16 | End: 2018-08-16 | Stop reason: HOSPADM

## 2018-08-16 RX ORDER — ONDANSETRON 2 MG/ML
4 INJECTION INTRAMUSCULAR; INTRAVENOUS EVERY 6 HOURS PRN
Status: DISCONTINUED | OUTPATIENT
Start: 2018-08-16 | End: 2018-08-17 | Stop reason: HOSPADM

## 2018-08-16 RX ORDER — BUPIVACAINE HYDROCHLORIDE AND EPINEPHRINE 5; 5 MG/ML; UG/ML
INJECTION, SOLUTION PERINEURAL PRN
Status: DISCONTINUED | OUTPATIENT
Start: 2018-08-16 | End: 2018-08-16

## 2018-08-16 RX ORDER — AMOXICILLIN 250 MG
1 CAPSULE ORAL AT BEDTIME
Status: DISCONTINUED | OUTPATIENT
Start: 2018-08-16 | End: 2018-08-17 | Stop reason: HOSPADM

## 2018-08-16 RX ORDER — MORPHINE SULFATE 30 MG/1
30 TABLET, FILM COATED, EXTENDED RELEASE ORAL EVERY 12 HOURS SCHEDULED
Status: DISCONTINUED | OUTPATIENT
Start: 2018-08-16 | End: 2018-08-17 | Stop reason: HOSPADM

## 2018-08-16 RX ORDER — LIDOCAINE HYDROCHLORIDE 20 MG/ML
INJECTION, SOLUTION INFILTRATION; PERINEURAL PRN
Status: DISCONTINUED | OUTPATIENT
Start: 2018-08-16 | End: 2018-08-16

## 2018-08-16 RX ORDER — DIMENHYDRINATE 50 MG/ML
25 INJECTION, SOLUTION INTRAMUSCULAR; INTRAVENOUS
Status: DISCONTINUED | OUTPATIENT
Start: 2018-08-16 | End: 2018-08-16 | Stop reason: HOSPADM

## 2018-08-16 RX ORDER — FENTANYL CITRATE 50 UG/ML
INJECTION, SOLUTION INTRAMUSCULAR; INTRAVENOUS PRN
Status: DISCONTINUED | OUTPATIENT
Start: 2018-08-16 | End: 2018-08-16

## 2018-08-16 RX ORDER — ONDANSETRON 4 MG/1
4 TABLET, ORALLY DISINTEGRATING ORAL EVERY 6 HOURS PRN
Status: DISCONTINUED | OUTPATIENT
Start: 2018-08-16 | End: 2018-08-17 | Stop reason: HOSPADM

## 2018-08-16 RX ORDER — ONDANSETRON 2 MG/ML
INJECTION INTRAMUSCULAR; INTRAVENOUS PRN
Status: DISCONTINUED | OUTPATIENT
Start: 2018-08-16 | End: 2018-08-16

## 2018-08-16 RX ORDER — GINSENG 100 MG
CAPSULE ORAL PRN
Status: DISCONTINUED | OUTPATIENT
Start: 2018-08-16 | End: 2018-08-16

## 2018-08-16 RX ORDER — HYDROMORPHONE HYDROCHLORIDE 1 MG/ML
.3-.5 INJECTION, SOLUTION INTRAMUSCULAR; INTRAVENOUS; SUBCUTANEOUS EVERY 5 MIN PRN
Status: DISCONTINUED | OUTPATIENT
Start: 2018-08-16 | End: 2018-08-16 | Stop reason: HOSPADM

## 2018-08-16 RX ORDER — CHLORHEXIDINE GLUCONATE ORAL RINSE 1.2 MG/ML
15 SOLUTION DENTAL 4 TIMES DAILY
Status: DISCONTINUED | OUTPATIENT
Start: 2018-08-16 | End: 2018-08-17 | Stop reason: HOSPADM

## 2018-08-16 RX ORDER — BUPIVACAINE HYDROCHLORIDE 2.5 MG/ML
INJECTION, SOLUTION INFILTRATION; PERINEURAL PRN
Status: DISCONTINUED | OUTPATIENT
Start: 2018-08-16 | End: 2018-08-16

## 2018-08-16 RX ORDER — EPHEDRINE SULFATE 50 MG/ML
INJECTION, SOLUTION INTRAMUSCULAR; INTRAVENOUS; SUBCUTANEOUS PRN
Status: DISCONTINUED | OUTPATIENT
Start: 2018-08-16 | End: 2018-08-16

## 2018-08-16 RX ADMIN — DEXAMETHASONE SODIUM PHOSPHATE 6 MG: 4 INJECTION, SOLUTION INTRA-ARTICULAR; INTRALESIONAL; INTRAMUSCULAR; INTRAVENOUS; SOFT TISSUE at 08:06

## 2018-08-16 RX ADMIN — SODIUM CHLORIDE: 9 INJECTION, SOLUTION INTRAVENOUS at 18:42

## 2018-08-16 RX ADMIN — LIDOCAINE HYDROCHLORIDE 80 MG: 20 INJECTION, SOLUTION INFILTRATION; PERINEURAL at 07:38

## 2018-08-16 RX ADMIN — MIDAZOLAM 2 MG: 1 INJECTION INTRAMUSCULAR; INTRAVENOUS at 07:26

## 2018-08-16 RX ADMIN — PHENYLEPHRINE HYDROCHLORIDE 50 MCG: 10 INJECTION, SOLUTION INTRAMUSCULAR; INTRAVENOUS; SUBCUTANEOUS at 09:23

## 2018-08-16 RX ADMIN — FENTANYL CITRATE 25 MCG: 50 INJECTION, SOLUTION INTRAMUSCULAR; INTRAVENOUS at 14:30

## 2018-08-16 RX ADMIN — ROCURONIUM BROMIDE 20 MG: 10 INJECTION INTRAVENOUS at 08:45

## 2018-08-16 RX ADMIN — MORPHINE SULFATE 30 MG: 30 TABLET, EXTENDED RELEASE ORAL at 20:00

## 2018-08-16 RX ADMIN — VANCOMYCIN HYDROCHLORIDE 1750 MG: 1 INJECTION, POWDER, LYOPHILIZED, FOR SOLUTION INTRAVENOUS at 20:01

## 2018-08-16 RX ADMIN — FENTANYL CITRATE 50 MCG: 50 INJECTION, SOLUTION INTRAMUSCULAR; INTRAVENOUS at 08:17

## 2018-08-16 RX ADMIN — ONDANSETRON 4 MG: 2 INJECTION INTRAMUSCULAR; INTRAVENOUS at 11:57

## 2018-08-16 RX ADMIN — ROCURONIUM BROMIDE 30 MG: 10 INJECTION INTRAVENOUS at 09:30

## 2018-08-16 RX ADMIN — GENTAMICIN SULFATE 300 MG: 40 INJECTION, SOLUTION INTRAMUSCULAR; INTRAVENOUS at 07:50

## 2018-08-16 RX ADMIN — Medication 0.5 MG: at 15:13

## 2018-08-16 RX ADMIN — TOLTERODINE 4 MG: 4 CAPSULE, EXTENDED RELEASE ORAL at 20:00

## 2018-08-16 RX ADMIN — CHLORHEXIDINE GLUCONATE 15 ML: 1.2 RINSE ORAL at 20:01

## 2018-08-16 RX ADMIN — SODIUM CHLORIDE, POTASSIUM CHLORIDE, SODIUM LACTATE AND CALCIUM CHLORIDE: 600; 310; 30; 20 INJECTION, SOLUTION INTRAVENOUS at 07:26

## 2018-08-16 RX ADMIN — ROCURONIUM BROMIDE 10 MG: 10 INJECTION INTRAVENOUS at 10:10

## 2018-08-16 RX ADMIN — FENTANYL CITRATE 50 MCG: 50 INJECTION, SOLUTION INTRAMUSCULAR; INTRAVENOUS at 13:32

## 2018-08-16 RX ADMIN — ROCURONIUM BROMIDE 10 MG: 10 INJECTION INTRAVENOUS at 12:16

## 2018-08-16 RX ADMIN — CHLORHEXIDINE GLUCONATE 15 ML: 1.2 RINSE ORAL at 08:28

## 2018-08-16 RX ADMIN — FENTANYL CITRATE 50 MCG: 50 INJECTION, SOLUTION INTRAMUSCULAR; INTRAVENOUS at 09:55

## 2018-08-16 RX ADMIN — ROCURONIUM BROMIDE 20 MG: 10 INJECTION INTRAVENOUS at 11:12

## 2018-08-16 RX ADMIN — TRAZODONE HYDROCHLORIDE 100 MG: 100 TABLET ORAL at 22:30

## 2018-08-16 RX ADMIN — ACETAMINOPHEN 650 MG: 325 TABLET, FILM COATED ORAL at 17:51

## 2018-08-16 RX ADMIN — OXYCODONE HYDROCHLORIDE 10 MG: 5 TABLET ORAL at 20:54

## 2018-08-16 RX ADMIN — ROCURONIUM BROMIDE 10 MG: 10 INJECTION INTRAVENOUS at 13:01

## 2018-08-16 RX ADMIN — BACITRACIN 1 PACKAGE: 500 OINTMENT TOPICAL at 13:26

## 2018-08-16 RX ADMIN — VANCOMYCIN HYDROCHLORIDE 1500 MG: 10 INJECTION, POWDER, LYOPHILIZED, FOR SOLUTION INTRAVENOUS at 07:08

## 2018-08-16 RX ADMIN — OXYCODONE HYDROCHLORIDE 5 MG: 5 TABLET ORAL at 16:18

## 2018-08-16 RX ADMIN — ROCURONIUM BROMIDE 10 MG: 10 INJECTION INTRAVENOUS at 11:40

## 2018-08-16 RX ADMIN — ROCURONIUM BROMIDE 20 MG: 10 INJECTION INTRAVENOUS at 09:05

## 2018-08-16 RX ADMIN — Medication 5 MG: at 10:29

## 2018-08-16 RX ADMIN — BUPIVACAINE HYDROCHLORIDE AND EPINEPHRINE BITARTRATE 30 ML: 5; .005 INJECTION, SOLUTION EPIDURAL; INTRACAUDAL; PERINEURAL at 10:49

## 2018-08-16 RX ADMIN — BUPIVACAINE HYDROCHLORIDE 10 ML: 2.5 INJECTION, SOLUTION INFILTRATION; PERINEURAL at 08:13

## 2018-08-16 RX ADMIN — FENTANYL CITRATE 50 MCG: 50 INJECTION, SOLUTION INTRAMUSCULAR; INTRAVENOUS at 09:32

## 2018-08-16 RX ADMIN — PROPOFOL 200 MG: 10 INJECTION, EMULSION INTRAVENOUS at 07:39

## 2018-08-16 RX ADMIN — ROCURONIUM BROMIDE 10 MG: 10 INJECTION INTRAVENOUS at 12:36

## 2018-08-16 RX ADMIN — SUGAMMADEX 200 MG: 100 INJECTION, SOLUTION INTRAVENOUS at 13:25

## 2018-08-16 RX ADMIN — ROCURONIUM BROMIDE 10 MG: 10 INJECTION INTRAVENOUS at 10:48

## 2018-08-16 RX ADMIN — Medication 5 MG: at 08:58

## 2018-08-16 RX ADMIN — HYDROMORPHONE HYDROCHLORIDE 0.5 MG: 1 INJECTION, SOLUTION INTRAMUSCULAR; INTRAVENOUS; SUBCUTANEOUS at 13:00

## 2018-08-16 RX ADMIN — FENTANYL CITRATE 100 MCG: 50 INJECTION, SOLUTION INTRAMUSCULAR; INTRAVENOUS at 07:38

## 2018-08-16 RX ADMIN — FENTANYL CITRATE 25 MCG: 50 INJECTION, SOLUTION INTRAMUSCULAR; INTRAVENOUS at 14:18

## 2018-08-16 RX ADMIN — PHENYLEPHRINE HYDROCHLORIDE 50 MCG: 10 INJECTION, SOLUTION INTRAMUSCULAR; INTRAVENOUS; SUBCUTANEOUS at 10:29

## 2018-08-16 RX ADMIN — SERTRALINE HYDROCHLORIDE 150 MG: 50 TABLET ORAL at 22:29

## 2018-08-16 RX ADMIN — ROCURONIUM BROMIDE 80 MG: 10 INJECTION INTRAVENOUS at 07:39

## 2018-08-16 RX ADMIN — ACETAMINOPHEN 975 MG: 325 TABLET, FILM COATED ORAL at 06:37

## 2018-08-16 RX ADMIN — SENNOSIDES AND DOCUSATE SODIUM 1 TABLET: 8.6; 5 TABLET ORAL at 22:30

## 2018-08-16 NOTE — PHARMACY-VANCOMYCIN DOSING SERVICE
Pharmacy Vancomycin Initial Note  Date of Service 2018  Patient's  1970  48 year old, male    Indication: periop pharmacoprophylaxis    Current estimated CrCl = CrCl cannot be calculated (No order found.).    Creatinine for last 3 days  No results found for requested labs within last 72 hours.    Recent Vancomycin Level(s) for last 3 days  No results found for requested labs within last 72 hours.      Vancomycin IV Administrations (past 72 hours)                   vancomycin (VANCOCIN) 1,500 mg in sodium chloride 0.9 % 250 mL intermittent infusion (mg) 1,500 mg New Bag 18 0708                Nephrotoxins and other renal medications (Future)    Start     Dose/Rate Route Frequency Ordered Stop    18 1900  vancomycin (VANCOCIN) 1,750 mg in sodium chloride 0.9 % 500 mL intermittent infusion      1,750 mg  over 2 Hours Intravenous EVERY 12 HOURS 18 1801 18 0659          Contrast Orders - past 72 hours     None           Plan:  1. Per discussion with urology resident, team wanted 2 doses of vancomycin to cover 24 hours. Ordered one time 15mg/kg dose as requested to time out 24 hours after procedure started. If vancomycin is to be continued, need SCr to dose any further.    Zenia Tim, ConnieD

## 2018-08-16 NOTE — IP AVS SNAPSHOT
[address] [address]    Phone:  [phone]                                       After Visit Summary   8/16/2018    Bakari White    MRN: [id number]           After Visit Summary Signature Page     I have received my discharge instructions, and my questions have been answered. I have discussed any challenges I see with this plan with the nurse or doctor.    ..........................................................................................................................................  Patient/Patient Representative Signature      ..........................................................................................................................................  Patient Representative Print Name and Relationship to Patient    ..................................................               ................................................  Date                                            Time    ..........................................................................................................................................  Reviewed by Signature/Title    ...................................................              ..............................................  Date                                                            Time

## 2018-08-16 NOTE — ANESTHESIA CARE TRANSFER NOTE
Patient: Bakari White    Procedure(s):  Anterior Urethroplasty With Double Buccal Graft And Supra Pubic Catheter Placement - Wound Class: II-Clean Contaminated    Diagnosis: Post-Traumatic Stricture Of Anterior Urethra   Diagnosis Additional Information: No value filed.    Anesthesia Type:   General, ETT     Note:  Airway :Face Mask  Patient transferred to:PACU  Comments: Pt alert, breathing spontaneously on 6L O2 via FM. VSS. Report shared with RN.Handoff Report: Identifed the Patient, Identified the Reponsible Provider, Reviewed the pertinent medical history, Discussed the surgical course, Reviewed Intra-OP anesthesia mangement and issues during anesthesia, Set expectations for post-procedure period and Allowed opportunity for questions and acknowledgement of understanding      Vitals: (Last set prior to Anesthesia Care Transfer)    CRNA VITALS  8/16/2018 1309 - 8/16/2018 1401      8/16/2018             Pulse: 102    SpO2: 100 %    Resp Rate (set): 10                Electronically Signed By: CHANEL Cade CRNA  August 16, 2018  2:01 PM

## 2018-08-16 NOTE — BRIEF OP NOTE
[facility], [place]    Brief Operative Note    Pre-operative diagnosis: Post-Traumatic Stricture Of Anterior Urethra   Post-operative diagnosis * No post-op diagnosis entered *  Procedure: Procedure(s):  Anterior Urethroplasty With Double Buccal Graft And Supra Pubic Catheter Placement - Wound Class: II-Clean Contaminated  Surgeon: Surgeon(s) and Role:     * Deep Gloria MD - Primary     * Joao Mcclendon MD - Resident - Assisting  Anesthesia: General   Estimated blood loss: 100 ml    Drains:  16 Fr urethral yi; 16 Fr suprapubic tube  Specimens: * No specimens in log *  Findings:   12 cm stricture extending from meatus to distal bulbar urethra. 13 cm excised with two 6.5 cm buccal grafts placed. .  Complications: None.  Implants: None.

## 2018-08-16 NOTE — ANESTHESIA POSTPROCEDURE EVALUATION
Patient: Bakari White    Procedure(s):  Anterior Urethroplasty With Double Buccal Graft And Supra Pubic Catheter Placement - Wound Class: II-Clean Contaminated    Diagnosis:Post-Traumatic Stricture Of Anterior Urethra   Diagnosis Additional Information: No value filed.    Anesthesia Type:  General, ETT    Note:  Anesthesia Post Evaluation    Patient location during evaluation: PACU  Patient participation: Able to fully participate in evaluation  Level of consciousness: sleepy but conscious and awake  Pain management: adequate  Airway patency: patent  Cardiovascular status: acceptable  Respiratory status: acceptable  Hydration status: acceptable  PONV: controlled     Anesthetic complications: None    Comments: Patient awake to voice, clear a/w.  Stable VS.  No new or current issues evident at this time.  OK out per PACU protocol.  --rcp          Last vitals:  Vitals:    08/16/18 1400 08/16/18 1415 08/16/18 1430   BP: 141/81 139/90 (!) 116/97   Resp: 16 16 18   Temp: 36.5  C (97.7  F)     SpO2: 97% 97% 97%         Electronically Signed By: Pawan Chin MD  August 16, 2018  2:41 PM

## 2018-08-16 NOTE — IP AVS SNAPSHOT
MRN:[id number]                      After Visit Summary   8/16/2018    Bakari White    MRN: [id number]           Thank you!     Thank you for choosing [facility] for your care. Our goal is always to provide you with excellent care. Hearing back from our patients is one way we can continue to improve our services. Please take a few minutes to complete the written survey that you may receive in the mail after you visit with us. Thank you!        Patient Information     Date Of Birth          1970        Designated Caregiver       Most Recent Value    Caregiver    Will someone help with your care after discharge? yes    Name of designated caregiver wife    Phone number of caregiver in chart    Caregiver address in chart      About your hospital stay     You were admitted on:  August 16, 2018 You last received care in the:  [address] [facility]    You were discharged on:  August 17, 2018        Reason for your hospital stay       On 8/16/18 Mr. White underwent:  1. Cystoscopy.   2. Butler of buccal mucosa graft from the bilateral oral cavity ( 2.5 cm wide x 6.5 cm long from left oral cavity, 2.5 cm wide x 6.5 cm long from right oral cavity).   3. Complex single stage anterior urethral reconstruction of urethral stricture extending from meatus to distal bulbar urethra utilizing a dorsal onlay of buccal mucosa graft x 2  4. Suprapubic tube placement  Surgeon: Dr. Gloria (Urology)                  Who to Call     For medical emergencies, please call 911.  For non-urgent questions about your medical care, please call your primary care provider or clinic, [phone]  For questions related to your surgery, please call your surgery clinic        Attending Provider     Provider Specialty    Deep Gloria MD Urology       Primary Care Provider Office Phone # Fax #    Milad Lozano [phone]      After Care Instructions     Activity       Your activity upon discharge: See discharge  instructions            Diet       Follow this diet upon discharge: See discharge instructions            Discharge Instructions       URETHROPLASTY INSTRUCTIONS:    Pain:  There will be discomfort in the area of the surgery that should continue to get better on a daily basis.  If you had a graft done, usually the mouth hurts more than the groin. You will notice some tightness and discomfort in one side of your mouth that may limit how much you can open your mouth.  You may prefer soft foods in the beginning but in general you can eat whatever feels comfortable.  Pain should improve steadily over the next several weeks.    PAIN: Oxycodone is a narcotic medication that has been prescribed for pain.  Narcotics will cause sleepiness and constipation and can become addictive, therefore it is best to stop or reduce them as soon as you can.  Any left over narcotics should be disposed of with an Authorized  for unneeded medications.  Contact your [facility]'s or [facility] [employer]'s household trash and recycling service to learn about medication disposal options and guidelines for your area.  If you decide to store this medication at home it should be kept in a locked cabinet to prevent access to children or visitors. To reduce your narcotic use, take Tylenol (acetaminophen 625mg) every 4-6 hours as needed.  This has been prescribed for you.  Do not take more than 4,000mg of Tylenol (acetaminophen/ APAP) from all sources in any 24 hour period since this can cause liver damage.  Never drive, operate machinery or drink alcoholic beverages while you are taking narcotic pain medications.    - Tizanadine is listed as one of your home medications.  Never take tizanadine while you are taking Ciprofloxacin since this can cause heart arrhythmias.     BLADDER SPASMS: You may notice bladder spasms which can present as sharp sudden pains in the area of bladder (just above the pubic hair) or the tip of the penis.  This is usually  because the yi catheter irritates the bladder. You may feel like you have to urinate even though the bladder is fully drained. You will be given a prescription (Detrol LA) of a medication to be taken as needed, which can help with these. It is important to take the bladder spasm medicine because occasionally these bladder spasms can get intense enough to cause you to urinate around (rather than through) the catheter and this rush of urine can damage the urethroplasty work.    Urination:    - You will have the yi catheter in your penis to protect the surgical site.  This should remain capped.   - You will have a suprapubic Yi catheter in your bladder draining to gravity until your follow-up with Dr. Mckeon.  This should be secured to your abdomen at all times.  This will not generally restrict your activities, but you should be careful if you are driving such that it does not become a distraction.  You should apply the bacitracin antibiotic ointment (see below) to the tip of the penis twice a day.  You may notice a little blood, small amount of white discharge, or caking at the tip of the penis with the catheter in place.  This is normal but it can irritate the tip of the penis so it is best to wash this off in the shower.    Activity:  You can return to your normal level of activity as you are able, based upon your level of pain. Walking and lifting won't hurt the urethroplasty site but use good judgment: if something hurts then don't do it.  Restrictions:  You should not drive or drink alcoholic beverages while you are taking narcotic medications (see below).  You should not soak in a tub or pool until the catheter is removed.  Your wounds and catheter may get wet and daily showering with gentle washing is important to keep your wounds clean.    Wound Care:  You will have an incision between your scrotum and anus (and one in the mouth if you had a graft done). These will be closed with stitches that will  dissolve on their own and do not need to be removed.  You will not need any specific bandage on this area, but you may find wearing a small pad in your underwear can help protect from blood staining your underwear.   If you do have pads or bandages, change them whenever they become damp or soiled to keep bacteria away from your incisions.     Medications:   Oxycodone-  this is a narcotic pain medication which you should only need for two to three days after surgery.    Detrol LA (tolterodine) -  this is a bladder spasm medicine which you should take on a daily basis until your catheter has been removed.  Bacitracin Ointment -  this is an antibiotic ointment to apply to the tip of the penis 2-3 times daily to help with discomfort from the Allan cathteter  Ciprofloxacin - this is an antibiotic pill to take the morning of Allan catheter removal  Pericolace - this is a stool softener.  Please take this twice daily.  The surgery, pain medications, and bladder spasm medications can lead to constipation.  You can stop the pericolace or reduce it if you are having loose stools.  Other over the counter solutions such as prune juice, miralax, fiber products, senna, and dulcolax can also be used.  If you have not had a bowel movement in 3 days start over-the-counter Milk of Magnesia taken as directed twice daily until you have a good result.  Call the office with concerns.   Peridex - if you had a buccal graft you will also have this mouth rinse to use every two hours while you are awake until it is gone to help prevent infection and promote healing.            Supplies       List the supplies the pt needs to go home:  Allan catheter cares, secures, bags, etc.                  Follow-up Appointments     Adult [facility] Follow-up and recommended labs and tests       Follow-up:  - Your follow-up appointment in Dr. Gloria's clinic is on: 9/7/18  - You will have an X-ray and catheter removal done immediately before the clinic  visit.  TAKE YOUR ANTIBIOTIC TABLET, one hour before your appointments.     Questions:  - Call or return sooner than your regularly scheduled visit if you develop any of the following:  Fever (greater than 101.3F) or flu-like symptoms, uncontrolled pain, uncontrolled nausea or vomiting, as well as increasing redness, swelling, or drainage from your wound or any concerns about your catheter drainage.  Here are some phone numbers and emails you can use to reach us:  - Aviva Mckeon, Nurse Coordinator (8A-5P, M-F): [phone]   - Nursing phone helpline at the Urology Clinic (8A-5P M-F): [phone]  - Nights or weekends, call [phone] and ask the  to page the urology resident on call.   - For emergencies, always call 911      Appointments on [place] and/or [facility] (with [facility] or [facility] provider or service). Call [phone] if you haven't heard regarding these appointments within 7 days of discharge.                  Your next 10 appointments already scheduled     Sep 07, 2018  2:00 PM CDT   XR CYSTOGRAM VOIDING with UCXR2, UC GIGU RAD   [facility] ([facility])    [address]  1st Floor  [place] [address] [phone]           Please bring a list of your current medicines to your exam. (Include vitamins, minerals and over-thecounter medicines.) Leave your valuables at home.  Tell your doctor if there is a chance you may be pregnant.  You do not need to do anything special for this exam.            Sep 07, 2018  3:00 PM CDT   (Arrive by 2:45 PM)   Post-Op with Deep Gloria MD   [facility] ([facility] [place])    [address]  4th Floor  [place] [address] [phone]              Pending Results     No orders found for last 3 day(s).            Statement of Approval     Ordered          08/17/18 0842  I have reviewed and agree with all the  "recommendations and orders detailed in this document.  EFFECTIVE NOW     Approved and electronically signed by:  Teresita Garcia PA             Admission Information     Date & Time Provider Department Dept. Phone    8/16/2018 Deep Gloria MD [address] [facility] [phone]      Your Vitals Were     Blood Pressure Temperature Respirations Height Weight Pulse Oximetry    122/58 (BP Location: Right arm) 97.6  F (36.4  C) (Oral) 20 1.753 m (5' 9\") 117.6 kg (259 lb 4.2 oz) 98%    BMI (Body Mass Index)                   38.29 kg/m2           MyChart Information     [email or web address] gives you secure access to your electronic health record. If you see a primary care provider, you can also send messages to your care team and make appointments. If you have questions, please call your primary care clinic.  If you do not have a primary care provider, please call [phone] and they will assist you.        Care EveryWhere ID     This is your Care EveryWhere ID. This could be used by other organizations to access your [facility] medical records  [id number]        Equal Access to Services     ELMIRA VARNER : Hadii carmel [place], waaxda luguerrero, qaybta kaalluli torre, keli cancino. So [facility] [phone].    ATENCIÓN: Si habla español, tiene a brown disposición servicios gratuitos de asistencia lingüística. Kiko al [phone].    We comply with applicable federal civil rights laws and [place] laws. We do not discriminate on the basis of race, color, national origin, age, disability, sex, sexual orientation, or gender identity.               Review of your medicines      START taking        Dose / Directions    acetaminophen 325 MG tablet   Commonly known as:  TYLENOL   Used for:  Acute post-operative pain        Dose:  650 mg   Take 2 tablets (650 mg) by mouth every 4 hours as needed for mild pain   Quantity:  150 tablet   Refills:  0       bacitracin 500 UNIT/GM Oint        Apply " topically 3 times daily To urinary meatus (tip of penis)   Quantity:  28 g   Refills:  0       chlorhexidine 0.12 % solution   Commonly known as:  PERIDEX        Dose:  15 mL   Swish and spit 15 mLs in mouth every 2 hours (while awake) Until solution is gone   Quantity:  473 mL   Refills:  0       oxyCODONE IR 10 MG tablet   Commonly known as:  ROXICODONE        Dose:  5-10 mg   Take 0.5-1 tablets (5-10 mg) by mouth every 4 hours as needed for moderate to severe pain   Quantity:  22 tablet   Refills:  0       senna-docusate 8.6-50 MG per tablet   Commonly known as:  SENOKOT-S;PERICOLACE   Used for:  Acute post-operative pain        Dose:  1 tablet   Take 1 tablet by mouth 2 times daily To prevent constipation   Quantity:  60 tablet   Refills:  0       tolterodine 4 MG 24 hr capsule   Commonly known as:  DETROL LA        Dose:  4 mg   Start taking on:  8/18/2018   Take 1 capsule (4 mg) by mouth daily   Quantity:  21 capsule   Refills:  0         CONTINUE these medicines which may have CHANGED, or have new prescriptions. If we are uncertain of the size of tablets/capsules you have at home, strength may be listed as something that might have changed.        Dose / Directions    ciprofloxacin 500 MG tablet   Commonly known as:  CIPRO   This may have changed:    - when to take this  - additional instructions   Used for:  Urinary tract infection        Dose:  500 mg   Take 1 tablet (500 mg) by mouth once for 1 dose Taken just prior to your catheter removal appointments in 3 weeks   Quantity:  1 tablet   Refills:  0       tiZANidine 2 MG tablet   Commonly known as:  ZANAFLEX   This may have changed:    - how much to take  - when to take this  - reasons to take this  - additional instructions   Used for:  Acute post-operative pain        Dose:  2 mg   Take 1 tablet (2 mg) by mouth 3 times daily as needed ( DO NOT TAKE WHILE TAKING CIPROFLOXACIN)   Quantity:  90 tablet   Refills:  0         CONTINUE these medicines which  have NOT CHANGED        Dose / Directions    albuterol 108 (90 Base) MCG/ACT inhaler   Commonly known as:  PROAIR HFA/PROVENTIL HFA/VENTOLIN HFA        Dose:  2 puff   Inhale 2 puffs into the lungs   Refills:  0       augmented betamethasone dipropionate 0.05 % cream   Commonly known as:  DIPROLENE-AF        Refills:  0       morphine 30 MG 12 hr tablet   Commonly known as:  MS CONTIN        Dose:  30 mg   Take 30 mg by mouth   Refills:  0       sertraline 100 MG tablet   Commonly known as:  ZOLOFT        Dose:  150 mg   Take 150 mg by mouth daily   Refills:  0       traZODone 50 MG tablet   Commonly known as:  DESYREL        Dose:   mg   Take  mg by mouth   Refills:  0            Where to get your medicines      These medications were sent to [facility] - [place], [place] - [address] [address]     Phone:  [phone]     acetaminophen 325 MG tablet    bacitracin 500 UNIT/GM Oint    chlorhexidine 0.12 % solution    ciprofloxacin 500 MG tablet    senna-docusate 8.6-50 MG per tablet    tolterodine 4 MG 24 hr capsule         Some of these will need a paper prescription and others can be bought over the counter. Ask your nurse if you have questions.     Bring a paper prescription for each of these medications     oxyCODONE IR 10 MG tablet                Protect others around you: Learn how to safely use, store and throw away your medicines at www.disposemymeds.org.        ANTIBIOTIC INSTRUCTION     You've Been Prescribed an Antibiotic - Now What?  Your healthcare team thinks that you or your loved one might have an infection. Some infections can be treated with antibiotics, which are powerful, life-saving drugs. Like all medications, antibiotics have side effects and should only be used when necessary. There are some important things you should know about your antibiotic treatment.      Your healthcare team may run tests before you start taking an  antibiotic.    Your team may take samples (e.g., from your blood, urine or other areas) to run tests to look for bacteria. These test can be important to determine if you need an antibiotic at all and, if you do, which antibiotic will work best.      Within a few days, your healthcare team might change or even stop your antibiotic.    Your team may start you on an antibiotic while they are working to find out what is making you sick.    Your team might change your antibiotic because test results show that a different antibiotic would be better to treat your infection.    In some cases, once your team has more information, they learn that you do not need an antibiotic at all. They may find out that you don't have an infection, or that the antibiotic you're taking won't work against your infection. For example, an infection caused by a virus can't be treated with antibiotics. Staying on an antibiotic when you don't need it is more likely to be harmful than helpful.      You may experience side effects from your antibiotic.    Like all medications, antibiotics have side effects. Some of these can be serious.    Let you healthcare team know if you have any known allergies when you are admitted to the hospital.    One significant side effect of nearly all antibiotics is the risk of severe and sometimes deadly diarrhea caused by Clostridium difficile (C. Difficile). This occurs when a person takes antibiotics because some good germs are destroyed. Antibiotic use allows C. diificile to take over, putting patients at high risk for this serious infection.    As a patient or caregiver, it is important to understand your or your loved one's antibiotic treatment. It is especially important for caregivers to speak up when patients can't speak for themselves. Here are some important questions to ask your healthcare team.    What infection is this antibiotic treating and how do you know I have that infection?    What side effects  might occur from this antibiotic?    How long will I need to take this antibiotic?    Is it safe to take this antibiotic with other medications or supplements (e.g., vitamins) that I am taking?     Are there any special directions I need to know about taking this antibiotic? For example, should I take it with food?    How will I be monitored to know whether my infection is responding to the antibiotic?    What tests may help to make sure the right antibiotic is prescribed for me?      Information provided by:  www.cdc.gov/getsmart  U.S. Department of Health and Human Services  Centers for disease Control and Prevention  National Center for Emerging and Zoonotic Infectious Diseases  Division of Healthcare Quality Promotion        Information about OPIOIDS     PRESCRIPTION OPIOIDS: WHAT YOU NEED TO KNOW   We gave you an opioid (narcotic) pain medicine. It is important to manage your pain, but opioids are not always the best choice. You should first try all the other options your care team gave you. Take this medicine for as short a time (and as few doses) as possible.    Some activities can increase your pain, such as bandage changes or therapy sessions. It may help to take your pain medicine 30 to 60 minutes before these activities. Reduce your stress by getting enough sleep, working on hobbies you enjoy and practicing relaxation or meditation. Talk to your care team about ways to manage your pain beyond prescription opioids.    These medicines have risks:    DO NOT drive when on new or higher doses of pain medicine. These medicines can affect your alertness and reaction times, and you could be arrested for driving under the influence (DUI). If you need to use opioids long-term, talk to your care team about driving.    DO NOT operate heavy machinery    DO NOT do any other dangerous activities while taking these medicines.    DO NOT drink any alcohol while taking these medicines.     If the opioid prescribed includes  acetaminophen, DO NOT take with any other medicines that contain acetaminophen. Read all labels carefully. Look for the word  acetaminophen  or  Tylenol.  Ask your pharmacist if you have questions or are unsure.    You can get addicted to pain medicines, especially if you have a history of addiction (chemical, alcohol or substance dependence). Talk to your care team about ways to reduce this risk.    All opioids tend to cause constipation. Drink plenty of water and eat foods that have a lot of fiber, such as fruits, vegetables, prune juice, apple juice and high-fiber cereal. Take a laxative (Miralax, milk of magnesia, Colace, Senna) if you don t move your bowels at least every other day. Other side effects include upset stomach, sleepiness, dizziness, throwing up, tolerance (needing more of the medicine to have the same effect), physical dependence and slowed breathing.    Store your pills in a secure place, locked if possible. We will not replace any lost or stolen medicine. If you don t finish your medicine, please throw away (dispose) as directed by your pharmacist. The [place] Pollution Control Agency has more information about safe disposal: https://www.pca.[facility].[place].us/living-green/managing-unwanted-medications             Medication List: This is a list of all your medications and when to take them. Check marks below indicate your daily home schedule. Keep this list as a reference.      Medications           Morning Afternoon Evening Bedtime As Needed    acetaminophen 325 MG tablet   Commonly known as:  TYLENOL   Take 2 tablets (650 mg) by mouth every 4 hours as needed for mild pain   Last time this was given:  650 mg on 8/16/2018  5:51 PM                                albuterol 108 (90 Base) MCG/ACT inhaler   Commonly known as:  PROAIR HFA/PROVENTIL HFA/VENTOLIN HFA   Inhale 2 puffs into the lungs                                augmented betamethasone dipropionate 0.05 % cream   Commonly known as:   DIPROLENE-AF                                bacitracin 500 UNIT/GM Oint   Apply topically 3 times daily To urinary meatus (tip of penis)   Last time this was given:  1 Package on 8/16/2018  1:26 PM                                chlorhexidine 0.12 % solution   Commonly known as:  PERIDEX   Swish and spit 15 mLs in mouth every 2 hours (while awake) Until solution is gone   Last time this was given:  15 mLs on 8/17/2018  8:18 AM                                ciprofloxacin 500 MG tablet   Commonly known as:  CIPRO   Take 1 tablet (500 mg) by mouth once for 1 dose Taken just prior to your catheter removal appointments in 3 weeks                                morphine 30 MG 12 hr tablet   Commonly known as:  MS CONTIN   Take 30 mg by mouth   Last time this was given:  30 mg on 8/17/2018  8:18 AM                                oxyCODONE IR 10 MG tablet   Commonly known as:  ROXICODONE   Take 0.5-1 tablets (5-10 mg) by mouth every 4 hours as needed for moderate to severe pain   Last time this was given:  10 mg on 8/17/2018  5:42 AM                                senna-docusate 8.6-50 MG per tablet   Commonly known as:  SENOKOT-S;PERICOLACE   Take 1 tablet by mouth 2 times daily To prevent constipation   Last time this was given:  1 tablet on 8/16/2018 10:30 PM                                sertraline 100 MG tablet   Commonly known as:  ZOLOFT   Take 150 mg by mouth daily   Last time this was given:  150 mg on 8/16/2018 10:29 PM                                tiZANidine 2 MG tablet   Commonly known as:  ZANAFLEX   Take 1 tablet (2 mg) by mouth 3 times daily as needed ( DO NOT TAKE WHILE TAKING CIPROFLOXACIN)                                tolterodine 4 MG 24 hr capsule   Commonly known as:  DETROL LA   Take 1 capsule (4 mg) by mouth daily   Start taking on:  8/18/2018   Last time this was given:  4 mg on 8/17/2018  8:18 AM                                traZODone 50 MG tablet   Commonly known as:  DESYREL   Take   mg by mouth   Last time this was given:  100 mg on 8/16/2018 10:30 PM                                          More Information        Discharge Instructions: Caring for Your Indwelling Urinary Catheter  You have been discharged with an indwelling urinary catheter (also called a Allan catheter). A catheter is a thin, flexible tube. An indwelling urinary catheter has two parts. The first part is a tube that drains urine from your bladder. The second part is a bag or other device that collects the urine.  The most important thing to remember is that you want to prevent infection. Always wash your hands before handling your catheter bag or tubing.  Draining the bedside bag    Wash your hands with soap and clean, running water or use an alcohol-based hand  that contains at least 60% alcohol.    Hold the drainage tube over a toilet or measuring container.    Unclamp the tube and let the bag drain.    Don t touch the tip of the drainage tube or let it touch the toilet or container.  Cleaning the drainage tube    When the bag is empty, clean the tip of the drainage tube with an alcohol wipe.    Clamp the tube.    Reinsert the tube into the pocket on the drainage bag.  Cleaning your skin and tubing    Clean the skin near the catheter with soap and water.    Wash your genital area from front to back.    Wash the catheter tubing. Always wash the catheter in the direction away from your body.    You will be told when and how to change your bag and tubing.    Don t try to remove the catheter by yourself.    You may shower with the catheter in place.  Emptying a leg bag    Wash your hands.    Remove the stopper on the bag.    Drain the bag into the toilet or a measuring container. Don t let the tip of the drainage tube touch anything, including your fingers.    Clean the tip of the drainage tube with alcohol.    Replace the stopper.  Follow-up care  Make a follow-up appointment as directed by your healthcare  provider  When to call your healthcare provider  Call your healthcare provider right away if you have any of the following:    Chills or fever above 100.4 F (38 C)    Leakage around the catheter insertion site    Increased spasms (uncontrollable twitching) in your legs, abdomen, or bladder. Occasional mild spasms are normal.    Burning in the urinary tract, penis, or genital area    Nausea and vomiting    Aching in the lower back    Cloudy or bloody (pink or red) urine, sediment or mucus in the urine, or foul-smelling urine   Date Last Reviewed: 1/1/2017 2000-2017 The [employer]. [address] [place] [id number]. All rights reserved. This information is not intended as a substitute for professional medical care. Always follow your healthcare professional's instructions.                Discharge Instructions: Caring for Your Leg Bag  You are going home with a urinary catheter and collection device (drainage bag) in place. One type of collection device is called a leg bag. This is a smaller drainage bag that you can wear on your leg to collect urine during the day. The bag can fit under your clothing. You can move around with greater ease when using a leg bag instead of a larger collection bag.  You were shown how to care for your catheter in the hospital. This sheet will help you remember those steps when you are at home.  Home care    Wash your hands thoroughly before and after you care for your catheter or collection device.    Gather your supplies:  ? Alcohol wipes  ? Soap and water  ? Towel and washcloth  ? Leg strap and leg bag    Use soap and water to wash the area where your catheter enters your body. Rinse well.    Secure the bag santoro to your leg:  ? Position the leg band high on your thigh with the product label pointing away from your leg.  ? Stretch the leg band in place and fasten.  ? Place the catheter tubing over the bag and secure it. You may secure it with a Velcro tab or  other method, depending on the product you use. Be sure to leave enough loop in the catheter above the leg band to avoid pulling on the tube.  ? Every 4 to 6 hours, reposition the band to prevent pressure from the elastic on your leg. You can do this by changing the bag to the other leg or by raising or lowering the leg band.  ? Wash the band as frequently as needed. You can hand wash and dry the leg band.    Place the bag in the bag santoro.    Clean the urine bag end of the catheter and your catheter port with an alcohol wipe.    Place a towel under the bag and port to keep urine from dripping onto your leg.    Before connecting the outlet valve at the bottom of the bag to the catheter, make sure that it is firmly closed. Flip the valve upward toward the bag; it needs to snap firmly in place. Be sure not to tug on the tubing. Be gentle.    Attach the urine bag to the end of the catheter; insert the connector snugly into the catheter port. You can avoid dribbling urine by bending the catheter tubing just below the tip and holding it while you disconnect it from the catheter. Be careful to keep the tip clean while connecting the leg bag tubing to the catheter--this keeps germs from getting into the system.    Drain the bag when it is full. To drain the bag, flip the clamp downward. Direct the flexible outlet tube to control the flow of urine. You don t have to disconnect the leg bag from the catheter to empty it. Raise your leg up to the edge of the toilet to reach the leg bag. Then you can empty the bag directly into the toilet. This way, you won t need to bend over, which may be uncomfortable.    Keep the leg bag clean. Rinse daily with equal parts water and vinegar to reduce odor and keep the bag free of germs.    Remember to keep the drainage bag below the level of your bladder for proper drainage.  Follow-up  Make a follow-up appointment as directed by your healthcare provider.  When to call your healthcare  "provider  Call your healthcare provider right away if you have any of the following:    Redness, swelling, or warmth around the catheter entry site    Pus draining from your catheter entry site or into the catheter tubing and bag    Blood, clots, or floating debris in the urine    Nausea and vomiting    Shaking chills    Fever above 100.4 F (38 C)    Pain that is not relieved by medicine     Catheter that falls out or is dislodged   Date Last Reviewed: 1/1/2017 2000-2017 The [employer]. [address] [place] [address]. All rights reserved. This information is not intended as a substitute for professional medical care. Always follow your healthcare professional's instructions.                Discharge Instructions: Caring for Your Suprapubic Catheter  You are going home with a suprapubic catheter in place. This tube is placed directly into the bladder through your abdomen to drain urine from your bladder. You were shown how to care for your catheter in the hospital. This sheet will help remind you of those steps and guidelines when you are at home.  Home care    Shower as necessary.     Change your dressing every day. Change the dressing more often if it falls off, becomes dirty, or has absorbed a lot of drainage.  Gather your supplies    Tape    Soap    Wash cloth    Wastebasket and plastic bag    Dressing sponges (4\" x 4\") that are cut or split MCC into the middle  Remove the dressing and check for problems    Wash your hands thoroughly before and after all catheter care.    Gently remove the old dressing if you have one.  ? Don t pull on the tube.  ? Check the dressing for drainage. Notice whether anything looks unusual or smells bad.  ? Place your dressing in the plastic bag and throw it away in the wastebasket.    Now look at the place where the catheter leaves your body (exit site).  ? Note any swelling, bleeding, irritation, unusual or smelly drainage.  ? Also check for any sores " "next to the exit site. Sores form around the exit site if there is too much pressure from the tube on the skin.  Clean the area    Wash the area around the catheter exit site gently with soap and water.    Gently pat the area dry    Don't use powders, creams, or sprays near the exit site.    Place a split 4\" x 4\" sponge around the catheter. Tape it in place.  Follow-up care  Make a follow-up appointment or as advised.  When to call your healthcare provider  Call your health care provider right away if you have any of the following:    Catheter that falls out, or is clogged or feels clogged    Stitches that fall out    Urine leaking around catheter    Urine that is cloudy, bloody, or smells bad    No urine drainage    Bladder that feels full or painful    Rash, itching, redness, swelling, or drainage at the catheter site    Fever above 100.4 F (38. C) or shaking chills   Date Last Reviewed: 1/1/2017 2000-2017 The [employer]. [address] [place] [address], [place] [address]. All rights reserved. This information is not intended as a substitute for professional medical care. Always follow your healthcare professional's instructions.             "

## 2018-08-16 NOTE — OP NOTE
OPERATIVE REPORT    PREOPERATIVE DIAGNOSIS:   1. Pendulous Urethral Stricture  2. Distal Bulbar Urethral Stricture  3. Lichen sclerosus     POSTOPERATIVE DIAGNOSIS:   1. Pendulous Urethral Stricture  2. Distal Bulbar Urethral Stricture  3. Lichen sclerosus     PROCEDURES:   1. Complex single stage anterior urethral reconstruction of urethral stricture (12 cm) extending from meatus to distal bulbar urethra utilizing a two separate dorsal onlay of buccal mucosa grafts (unusually complex due to lichen sclerosus, involvement of the meatus, and unusually long stricture necessitating two separate oral mucosal grafts)  2. [place] of full thickness buccal mucosa graft from the LEFT Oral cavity (2.2 cm wide x 6.5 cm long)  3. [place] of full thickness buccal mucosa graft from the RIGHT Oral cavity (2.2 cm wide x 6.5 cm long)  4. Cystoscopy with Suprapubic tube placement  5. Abdominal ultrasound to provide safe guidance for suprapubic tube placement    SURGEON:  Deep Gloria MD    RESIDENT:  Joao Mcclendon MD    ANESTHESIA: General    EBL:  100cc    DRAINS:   16 [place] suprapubic tube to gravity drainage     16 [place] urethral Allan catheter        INDICATIONS: Bakari White is a 48 year old gentleman with a nearly pan-urethral stricture (extending from the urethral meatus to the distal bulbar urethra) refractory to minimally-invasive management. The risks, benefits and alternatives of the multiple treatment options were described including one stage urethroplasty, two stage urethroplasty and continued endoscopic management. The patient wished to proceed with one stage dorsal onlay urethroplasty. He understood the risks to include but not be limited to bleeding, infection, penile pain or numbness, scrotal pain or numbness, change in erectile or ejaculatory function, lower extremity neuropathy, deep venous thrombosis. He wished to proceed.     DESCRIPTION OF PROCEDURE:   After informed consent was obtained and preoperative  antibiotics were given, the patient was taken to the operating room and placed supine on the operating table. General anesthesia was induced. He was intubated.  The patient was placed in the low lithotomy position. The genitals were shaved, prepped and draped in the usual sterile fashion. The mouth was prepped and draped in the usual sterile fashion.     We began by making a vertical midline incision was made in the dorsal midline of the penis from the penoscrotal junction to the subcoronal region. This incision was carried down to Beck's fascia. The urethra was then carefully mobilized off corporal bodies on the patient's left side extending from the meatus to just beyond where we believed the proximal end of the stricture to be. Focal bipolar cautery was used to stop bleeding perforating vessels. At this point we attempted to pass an 8 [place] bougie into the meatus but met resistance.  A sensor wire was then passed through the meatus past the stricture into the bulbar urethra.    The urethra was rotated to the right with stay sutures and a dorsal midline urethrotomy was made with a #15 blade scalpel over the tip of the sensor wire in the distal penile urethra. Holding stitches of 4-0 Vicryl were placed on the right side of the urethra. The urethrotomy was then continued proximally into the distal bulbar urethra. The proximal urethral opening calibrated to 24 Fr. Distally the incision extended all the way under the glans. The meatus was managed from the tip of the penis. A small wedge of scarred dorsal urethral meatus was excised. Additionally, numerous feathering cuts down the internal dorsal urethrotomy made to relax the scarred urethral plate and prepare it for grafting until the 24 [place] bougie could be accommodated through the meatus. The length of graft was then estimated by measuring the length from the urethral meatus to the proximal urethrotomy, which was 13 cm. The flexible cystoscope was used to confirm  an appropriate location for proximal urethrotomy.  The total length of strictured urethra measured 12 cm.  We incised 1 additional centimeter proximally.  There was squamous metaplasia proximal to the stricture.  There was roughly 3 cm of urethra between the proximal end of our incision and the external sphincter. The prostate was small . Bladder stones were absent. Trabeculations were absent. Tumors were absent.     At this point we placed a suprapubic tube under direct visualization.  Bedside ultrasound was used to ensure that there was no bowel in our trajectory.  A Chiba needle was used to confirm good trajectory, and then the Jorge trocar was used to penetrate the bladder.  A 16 [place] catheter was placed under direct visualization with 10 cc in the balloon.  This was attached to gravity drainage and the scope was removed.    We then turned our attention to harvesting appropriately sized buccal grafts.  A 6.5 cm x 2.2 cm full thickness buccal graft was harvested from the left oral cavity in the standard fashion. Three holding sutures of 2-0 silk were placed in the lip. A Yvette Sim mouth gag with baby sweetheart retractor was used. The graft was marked out with calipers and hydrodissection was achieved with marcaine with epinephrine. The graft was sharply harvested with a #15-blade scalpel taking care to avoid Jessenia's duct and leave the buccinator muscle down with the patient. The graft was defatted and tapered on the back table using a silicone block. The buccal mucosa was reapproximated in the mouth using a running 4-0 chromic suture.      Then, the mouth retractors were repositioned to allow right side harvest. A 6.5 cm x 2.2 cm full thickness buccal graft was harvested from the right oral cavity in the standard fashion. Three holding sutures of 2-0 silk were placed in the lip. A Yvette Sim mouth gag with baby sweetheart retractor was used. The graft was marked out with calipers and  hydrodissection was achieved with marcaine with epinephrine. The graft was sharply harvested with a #15-blade scalpel taking care to avoid Jessenia's duct and leave the buccinator muscle down with the patient. The graft was defatted and tapered on the back table using a silicone block. The buccal mucosa was reapproximated in the mouth using a running 4-0 chromic suture.      Packing was placed into the mouth bilaterally until the end of the procedure.    The corporal bodies were prepared for grafting by ensuring they were free of overlying tissue and controlling all bleeding points with bipolar cautery.     We preplaced several sutures of 5-0 PDS in the proximal and distal apices of the urethra. The first graft was brought into the field and tacked in place distally using several 4-0 Vicryl sutures. The distal end of the graft was secured in place in the meatus using interrupted 4-0 Vicryl sutures.  The remainder of the graft was then sewn to the inner edge of our urethrotomy using 5-0 PDS.      At this point our second graft was introduced to ensure that we would have adequate length for a tension-free anastomosis.  A second graft was secured using interrupted 4-0 Vicryl sutures.  Both grafts were quilted to the underlying corpora in multiple locations using 4-0 Vicryl sutures. The medial edge of the proximal graft was secured to the inner edge of our proximal urethrotomy using 5-0 PDS. The lateral edges of both grafts were then anastomosed to the outer edge of the urethrotomy again using 5-0 PDS.  Prior to completing our urethrotomy we were able to calibrate the proximal urethra with a 24 [place] bougie and the distal meatus with a 20 [place] bougie.  We also passed a 16 [place] catheter per urethra prior to completion of our anastomosis.  This passed easily into the bladder.  10 cc were instilled in the balloon and the catheter was capped.    Notably the length and location of this urethroplasty added about 2 hours  "to the usual anterior urethroplasty. This is because of need for meatal reconstruction, need for bilateral oral mucosal harvest, and need to perform 13 cm of urethral closure, which required substantial additional work and expertise compared to a routine anterior urethroplasty.    Once the urethroplasty was completed, we reapproximated the left edge of the corpus spongiosum to the marshall-corporal tissue using a running 4-0 Vicryl to provide an extra layer of closure.  Dartos fascia was closed in 2 layers with running 4-0 Vicryl.  The skin was closed with interrupted 4-0 Monocryl horizontal mattress sutures.  Bacitracin, Xeroform, Juan Alberto wrap, and Coban were applied.  The patient was awoken from anesthesia and was transferred to the PACU in stable condition.    Dr. Gloria was scrubbed and present for the entire procedure.    Lithotomy time: 5 hours 30 minutes  Operative time: 5 hours 0 minutes    5' 9\"  259 lbs 4.18 oz  Body mass index is 38.29 kg/(m^2).        "

## 2018-08-16 NOTE — IP AVS SNAPSHOT
"    [address] [facility]: [phone]                                              INTERAGENCY TRANSFER FORM - PHYSICIAN ORDERS   2018                    Hospital Admission Date: 2018  PHYLLIS GARCIA   : 1970  Sex: Male        Attending Provider: Deep Gloria MD     Allergies:  Etodolac, Gabapentin    Infection:  None   Service:  UROLOGY    Ht:  1.753 m (5' 9\")   Wt:  117.6 kg (259 lb 4.2 oz)   Admission Wt:  117.6 kg (259 lb 4.2 oz)    BMI:  38.29 kg/m 2   BSA:  2.39 m 2            Patient PCP Information     Provider PCP Type    [facility]      ED Clinical Impression     Diagnosis Description Comment Added By Time Added    Urethral stricture, unspecified stricture type [N35.9] Urethral stricture, unspecified stricture type [N35.9]  Teresita Garcia PA 2018  8:27 AM    Acute post-operative pain [G89.18] Acute post-operative pain [G89.18]  Teresita Garcia PA 2018  8:28 AM    Urinary tract infection [N39.0] Urinary tract infection [N39.0]  Teresita Garcia PA 2018  8:28 AM      Hospital Problems as of 2018              Priority Class Noted POA    Urethral stricture Medium  2018 Yes      Non-Hospital Problems as of 2018     None      Code Status History     Date Active Date Inactive Code Status Order ID Comments User Context    This patient has a current code status but no historical code status.         Medication Review      START taking        Dose / Directions Comments    acetaminophen 325 MG tablet   Commonly known as:  TYLENOL   Used for:  Acute post-operative pain        Dose:  650 mg   Take 2 tablets (650 mg) by mouth every 4 hours as needed for mild pain   Quantity:  150 tablet   Refills:  0        bacitracin 500 UNIT/GM Oint        Apply topically 3 times daily To urinary meatus (tip of penis)   Quantity:  28 g   Refills:  0        chlorhexidine 0.12 % solution   Commonly known as:  PERIDEX        Dose:  15 mL   Swish and spit 15 mLs in " mouth every 2 hours (while awake) Until solution is gone   Quantity:  473 mL   Refills:  0        oxyCODONE IR 10 MG tablet   Commonly known as:  ROXICODONE        Dose:  5-10 mg   Take 0.5-1 tablets (5-10 mg) by mouth every 4 hours as needed for moderate to severe pain   Quantity:  22 tablet   Refills:  0        senna-docusate 8.6-50 MG per tablet   Commonly known as:  SENOKOT-S;PERICOLACE   Used for:  Acute post-operative pain        Dose:  1 tablet   Take 1 tablet by mouth 2 times daily To prevent constipation   Quantity:  60 tablet   Refills:  0        tolterodine 4 MG 24 hr capsule   Commonly known as:  DETROL LA        Dose:  4 mg   Start taking on:  8/18/2018   Take 1 capsule (4 mg) by mouth daily   Quantity:  21 capsule   Refills:  0          CONTINUE these medications which may have CHANGED, or have new prescriptions. If we are uncertain of the size of tablets/capsules you have at home, strength may be listed as something that might have changed.        Dose / Directions Comments    ciprofloxacin 500 MG tablet   Commonly known as:  CIPRO   This may have changed:    - when to take this  - additional instructions   Used for:  Urinary tract infection        Dose:  500 mg   Take 1 tablet (500 mg) by mouth once for 1 dose Taken just prior to your catheter removal appointments in 3 weeks   Quantity:  1 tablet   Refills:  0        tiZANidine 2 MG tablet   Commonly known as:  ZANAFLEX   This may have changed:    - how much to take  - when to take this  - reasons to take this  - additional instructions   Used for:  Acute post-operative pain        Dose:  2 mg   Take 1 tablet (2 mg) by mouth 3 times daily as needed ( DO NOT TAKE WHILE TAKING CIPROFLOXACIN)   Quantity:  90 tablet   Refills:  0          CONTINUE these medications which have NOT CHANGED        Dose / Directions Comments    albuterol 108 (90 Base) MCG/ACT inhaler   Commonly known as:  PROAIR HFA/PROVENTIL HFA/VENTOLIN HFA        Dose:  2 puff   Inhale 2  puffs into the lungs   Refills:  0        augmented betamethasone dipropionate 0.05 % cream   Commonly known as:  DIPROLENE-AF        Refills:  0        morphine 30 MG 12 hr tablet   Commonly known as:  MS CONTIN        Dose:  30 mg   Take 30 mg by mouth   Refills:  0        sertraline 100 MG tablet   Commonly known as:  ZOLOFT        Dose:  150 mg   Take 150 mg by mouth daily   Refills:  0        traZODone 50 MG tablet   Commonly known as:  DESYREL        Dose:   mg   Take  mg by mouth   Refills:  0                Summary of Visit     Reason for your hospital stay       On 8/16/18 Mr. White underwent:  1. Cystoscopy.   2. [place] of buccal mucosa graft from the bilateral oral cavity ( 2.5 cm wide x 6.5 cm long from left oral cavity, 2.5 cm wide x 6.5 cm long from right oral cavity).   3. Complex single stage anterior urethral reconstruction of urethral stricture extending from meatus to distal bulbar urethra utilizing a dorsal onlay of buccal mucosa graft x 2  4. Suprapubic tube placement  Surgeon: Dr. Gloria (Urology)             After Care     Activity       Your activity upon discharge: See discharge instructions       Diet       Follow this diet upon discharge: See discharge instructions       Discharge Instructions       URETHROPLASTY INSTRUCTIONS:    Pain:  There will be discomfort in the area of the surgery that should continue to get better on a daily basis.  If you had a graft done, usually the mouth hurts more than the groin. You will notice some tightness and discomfort in one side of your mouth that may limit how much you can open your mouth.  You may prefer soft foods in the beginning but in general you can eat whatever feels comfortable.  Pain should improve steadily over the next several weeks.    PAIN: Oxycodone is a narcotic medication that has been prescribed for pain.  Narcotics will cause sleepiness and constipation and can become addictive, therefore it is best to stop or reduce  them as soon as you can.  Any left over narcotics should be disposed of with an Authorized  for unneeded medications.  Contact your [facility]'s or [facility]'s household trash and recycling service to learn about medication disposal options and guidelines for your area.  If you decide to store this medication at home it should be kept in a locked cabinet to prevent access to children or visitors. To reduce your narcotic use, take Tylenol (acetaminophen 625mg) every 4-6 hours as needed.  This has been prescribed for you.  Do not take more than 4,000mg of Tylenol (acetaminophen/ APAP) from all sources in any 24 hour period since this can cause liver damage.  Never drive, operate machinery or drink alcoholic beverages while you are taking narcotic pain medications.    - Tizanadine is listed as one of your home medications.  Never take tizanadine while you are taking Ciprofloxacin since this can cause heart arrhythmias.     BLADDER SPASMS: You may notice bladder spasms which can present as sharp sudden pains in the area of bladder (just above the pubic hair) or the tip of the penis.  This is usually because the yi catheter irritates the bladder. You may feel like you have to urinate even though the bladder is fully drained. You will be given a prescription (Detrol LA) of a medication to be taken as needed, which can help with these. It is important to take the bladder spasm medicine because occasionally these bladder spasms can get intense enough to cause you to urinate around (rather than through) the catheter and this rush of urine can damage the urethroplasty work.    Urination:    - You will have the yi catheter in your penis to protect the surgical site.  This should remain capped.   - You will have a suprapubic Yi catheter in your bladder draining to gravity until your follow-up with Dr. Mckeon.  This should be secured to your abdomen at all times.  This will not generally restrict your  activities, but you should be careful if you are driving such that it does not become a distraction.  You should apply the bacitracin antibiotic ointment (see below) to the tip of the penis twice a day.  You may notice a little blood, small amount of white discharge, or caking at the tip of the penis with the catheter in place.  This is normal but it can irritate the tip of the penis so it is best to wash this off in the shower.    Activity:  You can return to your normal level of activity as you are able, based upon your level of pain. Walking and lifting won't hurt the urethroplasty site but use good judgment: if something hurts then don't do it.  Restrictions:  You should not drive or drink alcoholic beverages while you are taking narcotic medications (see below).  You should not soak in a tub or pool until the catheter is removed.  Your wounds and catheter may get wet and daily showering with gentle washing is important to keep your wounds clean.    Wound Care:  You will have an incision between your scrotum and anus (and one in the mouth if you had a graft done). These will be closed with stitches that will dissolve on their own and do not need to be removed.  You will not need any specific bandage on this area, but you may find wearing a small pad in your underwear can help protect from blood staining your underwear.   If you do have pads or bandages, change them whenever they become damp or soiled to keep bacteria away from your incisions.     Medications:   Oxycodone-  this is a narcotic pain medication which you should only need for two to three days after surgery.    Detrol LA (tolterodine) -  this is a bladder spasm medicine which you should take on a daily basis until your catheter has been removed.  Bacitracin Ointment -  this is an antibiotic ointment to apply to the tip of the penis 2-3 times daily to help with discomfort from the Allan cathteter  Ciprofloxacin - this is an antibiotic pill to take the  morning of Allan catheter removal  Pericolace - this is a stool softener.  Please take this twice daily.  The surgery, pain medications, and bladder spasm medications can lead to constipation.  You can stop the pericolace or reduce it if you are having loose stools.  Other over the counter solutions such as prune juice, miralax, fiber products, senna, and dulcolax can also be used.  If you have not had a bowel movement in 3 days start over-the-counter Milk of Magnesia taken as directed twice daily until you have a good result.  Call the office with concerns.   Peridex - if you had a buccal graft you will also have this mouth rinse to use every two hours while you are awake until it is gone to help prevent infection and promote healing.       Supplies       List the supplies the pt needs to go home:  Allan catheter cares, secures, bags, etc.             Your next 10 appointments already scheduled     Sep 07, 2018  2:00 PM CDT   XR CYSTOGRAM VOIDING with UCXR2, UC GIGU RAD   [facility] [place] [facility] ([facility])    [address] [place] [address]  [address] [facility] [address] [phone]           Please bring a list of your current medicines to your exam. (Include vitamins, minerals and over-thecounter medicines.) Leave your valuables at home.  Tell your doctor if there is a chance you may be pregnant.  You do not need to do anything special for this exam.            Sep 07, 2018  3:00 PM CDT   (Arrive by 2:45 PM)   Post-Op with Deep Gloria MD   [facility] ([facility])    [address] [place] [address]  4th Floor  [facility] [address] [phone]              Follow-Up Appointment Instructions     Future Labs/Procedures    Adult [facility] Follow-up and recommended labs and tests     Comments:    Follow-up:  - Your follow-up appointment in Dr. Gloria's clinic is on: 9/7/18  - You will have an X-ray and catheter  removal done immediately before the clinic visit.  TAKE YOUR ANTIBIOTIC TABLET, one hour before your appointments.     Questions:  - Call or return sooner than your regularly scheduled visit if you develop any of the following:  Fever (greater than 101.3F) or flu-like symptoms, uncontrolled pain, uncontrolled nausea or vomiting, as well as increasing redness, swelling, or drainage from your wound or any concerns about your catheter drainage.  Here are some phone numbers and emails you can use to reach us:  - Aviva Mckeon, Nurse Coordinator (8A-5P, M-F): [phone]   - Nursing phone helpline at the Urology Clinic (8A-5P M-F): [phone]  - Nights or weekends, call [phone] and ask the  to page the urology resident on call.   - For emergencies, always call 911      Appointments on [place] and/or [facility] (with [facility] or [facility] provider or service). Call [phone] if you haven't heard regarding these appointments within 7 days of discharge.      Follow-Up Appointment Instructions     Adult [facility] Follow-up and recommended labs and tests       Follow-up:  - Your follow-up appointment in Dr. Gloria's clinic is on: 9/7/18  - You will have an X-ray and catheter removal done immediately before the clinic visit.  TAKE YOUR ANTIBIOTIC TABLET, one hour before your appointments.     Questions:  - Call or return sooner than your regularly scheduled visit if you develop any of the following:  Fever (greater than 101.3F) or flu-like symptoms, uncontrolled pain, uncontrolled nausea or vomiting, as well as increasing redness, swelling, or drainage from your wound or any concerns about your catheter drainage.  Here are some phone numbers and emails you can use to reach us:  - Aviva Mckeon, Nurse Coordinator (8A-5P, M-F): [phone]   - Nursing phone helpline at the Urology Clinic (8A-5P M-F): [phone]  - Nights or weekends, call [phone] and ask the  to page the urology resident on call.   -  For emergencies, always call 911      Appointments on [place] and/or [facility] (with [facility] or [facility] provider or service). Call [phone] if you haven't heard regarding these appointments within 7 days of discharge.             Statement of Approval     Ordered          08/17/18 0842  I have reviewed and agree with all the recommendations and orders detailed in this document.  EFFECTIVE NOW     Approved and electronically signed by:  Teresita Garcia PA

## 2018-08-17 VITALS
RESPIRATION RATE: 20 BRPM | DIASTOLIC BLOOD PRESSURE: 58 MMHG | HEIGHT: 69 IN | OXYGEN SATURATION: 98 % | BODY MASS INDEX: 38.4 KG/M2 | TEMPERATURE: 97.6 F | WEIGHT: 259.26 LBS | SYSTOLIC BLOOD PRESSURE: 122 MMHG

## 2018-08-17 PROCEDURE — 25000132 ZZH RX MED GY IP 250 OP 250 PS 637: Performed by: UROLOGY

## 2018-08-17 RX ORDER — GINSENG 100 MG
CAPSULE ORAL 3 TIMES DAILY
Qty: 28 G | Refills: 0 | Status: SHIPPED | OUTPATIENT
Start: 2018-08-17

## 2018-08-17 RX ORDER — OXYCODONE HYDROCHLORIDE 10 MG/1
5-10 TABLET ORAL EVERY 4 HOURS PRN
Qty: 22 TABLET | Refills: 0 | Status: SHIPPED | OUTPATIENT
Start: 2018-08-17

## 2018-08-17 RX ORDER — TOLTERODINE 4 MG/1
4 CAPSULE, EXTENDED RELEASE ORAL DAILY
Qty: 21 CAPSULE | Refills: 0 | Status: SHIPPED | OUTPATIENT
Start: 2018-08-18

## 2018-08-17 RX ORDER — CIPROFLOXACIN 500 MG/1
500 TABLET, FILM COATED ORAL ONCE
Qty: 1 TABLET | Refills: 0 | Status: SHIPPED | OUTPATIENT
Start: 2018-08-17 | End: 2018-08-17

## 2018-08-17 RX ORDER — ACETAMINOPHEN 325 MG/1
650 TABLET ORAL EVERY 4 HOURS PRN
Qty: 150 TABLET | Refills: 0 | Status: SHIPPED | OUTPATIENT
Start: 2018-08-17

## 2018-08-17 RX ORDER — TIZANIDINE 2 MG/1
2 TABLET ORAL 3 TIMES DAILY PRN
Qty: 90 TABLET | Refills: 0 | COMMUNITY
Start: 2018-08-17

## 2018-08-17 RX ORDER — CHLORHEXIDINE GLUCONATE ORAL RINSE 1.2 MG/ML
15 SOLUTION DENTAL
Qty: 473 ML | Refills: 0 | Status: SHIPPED | OUTPATIENT
Start: 2018-08-17

## 2018-08-17 RX ORDER — AMOXICILLIN 250 MG
1 CAPSULE ORAL 2 TIMES DAILY
Qty: 60 TABLET | Refills: 0 | Status: SHIPPED | OUTPATIENT
Start: 2018-08-17

## 2018-08-17 RX ADMIN — CHLORHEXIDINE GLUCONATE 15 ML: 1.2 RINSE ORAL at 08:18

## 2018-08-17 RX ADMIN — OXYCODONE HYDROCHLORIDE 10 MG: 5 TABLET ORAL at 05:42

## 2018-08-17 RX ADMIN — OXYCODONE HYDROCHLORIDE 10 MG: 5 TABLET ORAL at 00:36

## 2018-08-17 RX ADMIN — OXYCODONE HYDROCHLORIDE 10 MG: 5 TABLET ORAL at 10:52

## 2018-08-17 RX ADMIN — TOLTERODINE 4 MG: 4 CAPSULE, EXTENDED RELEASE ORAL at 08:18

## 2018-08-17 RX ADMIN — MORPHINE SULFATE 30 MG: 30 TABLET, EXTENDED RELEASE ORAL at 08:18

## 2018-08-17 ASSESSMENT — ACTIVITIES OF DAILY LIVING (ADL)
SWALLOWING: 0-->SWALLOWS FOODS/LIQUIDS WITHOUT DIFFICULTY
RETIRED_COMMUNICATION: 0-->UNDERSTANDS/COMMUNICATES WITHOUT DIFFICULTY
BATHING: 0-->INDEPENDENT
TRANSFERRING: 0-->INDEPENDENT
COGNITION: 0 - NO COGNITION ISSUES REPORTED
AMBULATION: 0-->INDEPENDENT
FALL_HISTORY_WITHIN_LAST_SIX_MONTHS: NO
RETIRED_EATING: 0-->INDEPENDENT
DRESS: 0-->INDEPENDENT
TOILETING: 0-->INDEPENDENT

## 2018-08-17 NOTE — DISCHARGE SUMMARY
[facility] Discharge Summary    Patient: Bakari White    MRN: [id number]   : 1970         Date of Admission:  2018   Date of Discharge::  2018  Admitting Physician:  Deep Gloria MD  Discharge Physician:  Teresita Garcia PA-C             Admission Diagnoses:   Post-Traumatic Stricture Of Anterior Urethra     Past Medical History:   Diagnosis Date     Pain management contract agreement              Discharge Diagnosis:   Post-Traumatic Stricture Of Anterior Urethra     Past Medical History:   Diagnosis Date     Pain management contract agreement           Procedures:   Procedure(s): 18 -   1. Cystoscopy.   2. [place] of buccal mucosa graft from the bilateral oral cavity ( 2.5 cm wide x 6.5 cm long from left oral cavity, 2.5 cm wide x 6.5 cm long from right oral cavity).   3. Preparation of wound bed for grafting   4. Complex single stage anterior urethral reconstruction of urethral stricture extending from meatus to distal bulbar urethra utilizing a dorsal onlay of buccal mucosa graft x 2  5. Suprapubic tube   Dr. Deep Gloria (Urology)            Medications Prior to Admission:     Prescriptions Prior to Admission   Medication Sig Dispense Refill Last Dose     albuterol (PROAIR HFA/PROVENTIL HFA/VENTOLIN HFA) 108 (90 Base) MCG/ACT Inhaler Inhale 2 puffs into the lungs   Past Month at Unknown time     augmented betamethasone dipropionate (DIPROLENE-AF) 0.05 % cream    Past Week at Unknown time     morphine (MS CONTIN) 30 MG 12 hr tablet Take 30 mg by mouth   2018 at 643     sertraline (ZOLOFT) 100 MG tablet Take 150 mg by mouth daily    8/15/2018 at 2100     traZODone (DESYREL) 50 MG tablet Take  mg by mouth   8/15/2018 at 2100             Discharge Medications:     Current Discharge Medication List      START taking these medications    Details   acetaminophen (TYLENOL) 325 MG tablet Take 2 tablets (650 mg) by mouth every 4 hours as needed for mild pain  Qty: 150  tablet, Refills: 0    Associated Diagnoses: Urethral stricture, unspecified stricture type; Acute post-operative pain      bacitracin 500 UNIT/GM OINT Apply topically 3 times daily To urinary meatus (tip of penis)  Qty: 28 g, Refills: 0    Associated Diagnoses: Urethral stricture, unspecified stricture type      chlorhexidine (PERIDEX) 0.12 % solution Swish and spit 15 mLs in mouth every 2 hours (while awake) Until solution is gone  Qty: 473 mL, Refills: 0    Associated Diagnoses: Urethral stricture, unspecified stricture type      oxyCODONE IR (ROXICODONE) 10 MG tablet Take 0.5-1 tablets (5-10 mg) by mouth every 4 hours as needed for moderate to severe pain  Qty: 22 tablet, Refills: 0    Associated Diagnoses: Urethral stricture, unspecified stricture type      senna-docusate (SENOKOT-S;PERICOLACE) 8.6-50 MG per tablet Take 1 tablet by mouth 2 times daily To prevent constipation  Qty: 60 tablet, Refills: 0    Associated Diagnoses: Urethral stricture, unspecified stricture type; Acute post-operative pain      tolterodine (DETROL LA) 4 MG 24 hr capsule Take 1 capsule (4 mg) by mouth daily  Qty: 21 capsule, Refills: 0    Associated Diagnoses: Urethral stricture, unspecified stricture type         CONTINUE these medications which have CHANGED    Details   ciprofloxacin (CIPRO) 500 MG tablet Take 1 tablet (500 mg) by mouth once for 1 dose Taken just prior to your catheter removal appointments in 3 weeks  Qty: 1 tablet, Refills: 0    Associated Diagnoses: Urinary tract infection      tiZANidine (ZANAFLEX) 2 MG tablet Take 1 tablet (2 mg) by mouth 3 times daily as needed ( DO NOT TAKE WHILE TAKING CIPROFLOXACIN)  Qty: 90 tablet, Refills: 0    Associated Diagnoses: Acute post-operative pain         CONTINUE these medications which have NOT CHANGED    Details   albuterol (PROAIR HFA/PROVENTIL HFA/VENTOLIN HFA) 108 (90 Base) MCG/ACT Inhaler Inhale 2 puffs into the lungs      augmented betamethasone dipropionate (DIPROLENE-AF)  0.05 % cream       morphine (MS CONTIN) 30 MG 12 hr tablet Take 30 mg by mouth      sertraline (ZOLOFT) 100 MG tablet Take 150 mg by mouth daily       traZODone (DESYREL) 50 MG tablet Take  mg by mouth                   Consultations:   Consultation during this admission received: None          Brief History of Illness:   Reason for admission requiring a surgical or invasive procedure:   Post-Traumatic Stricture Of Anterior Urethra    The patient underwent the following procedure(s):   See above   There were no immediate complications during this procedure.    Please refer to the full operative summary for details.           Hospital Course:   The patient's hospital course was remarkable for postoperative pain, and Mr. White was admitted to Observation for this.  Bakari White otherwise recovered as anticipated and experienced no post-operative complications.      On POD #1 he was ambulating without assitance, tolerating the discharge diet, had pain controlled with PO medications to go home with, and was requiring no IV medications or fluids. His was discharged with a urethral Allan - capped - and a suprapubic Allan draining to gravity. He was discharged home with appropriate contact information, follow-up and instructions as seen below in the discharge paperwork.         Discharge Labs:     No results found for: PSA  No lab results found in last 7 days.  No lab results found in last 7 days.  No lab results found in last 7 days.    Invalid input(s): URINEBLOOD  No results found for this or any previous visit.    Results for orders placed or performed during the hospital encounter of 08/16/18   Glucose by meter   Result Value Ref Range    Glucose 87 70 - 99 mg/dL          Discharge Instructions and Follow-Up:   URETHROPLASTY INSTRUCTIONS:    Pain:  There will be discomfort in the area of the surgery that should continue to get better on a daily basis.  If you had a graft done, usually the mouth hurts more than  the groin. You will notice some tightness and discomfort in one side of your mouth that may limit how much you can open your mouth.  You may prefer soft foods in the beginning but in general you can eat whatever feels comfortable.  Pain should improve steadily over the next several weeks.    PAIN: Oxycodone is a narcotic medication that has been prescribed for pain.  Narcotics will cause sleepiness and constipation and can become addictive, therefore it is best to stop or reduce them as soon as you can.  Any left over narcotics should be disposed of with an Authorized  for unneeded medications.  Contact your [facility] s or [facility] s household trash and recycling service to learn about medication disposal options and guidelines for your area.  If you decide to store this medication at home it should be kept in a locked cabinet to prevent access to children or visitors. To reduce your narcotic use, take Tylenol (acetaminophen 625mg) every 4-6 hours as needed.  This has been prescribed for you.  Do not take more than 4,000mg of Tylenol (acetaminophen/ APAP) from all sources in any 24 hour period since this can cause liver damage.  Never drive, operate machinery or drink alcoholic beverages while you are taking narcotic pain medications.    - Tizanadine is listed as one of your home medications.  Never take tizanadine while you are taking Ciprofloxacin since this can cause heart arrhythmias.     BLADDER SPASMS: You may notice bladder spasms which can present as sharp sudden pains in the area of bladder (just above the pubic hair) or the tip of the penis.  This is usually because the yi catheter irritates the bladder. You may feel like you have to urinate even though the bladder is fully drained. You will be given a prescription (Detrol LA) of a medication to be taken as needed, which can help with these. It is important to take the bladder spasm medicine because occasionally these bladder spasms can get  intense enough to cause you to urinate around (rather than through) the catheter and this rush of urine can damage the urethroplasty work.    Urination:    - You will have the yi catheter in your penis to protect the surgical site.  This should remain capped.   - You will have a suprapubic Yi catheter in your bladder draining to gravity until your follow-up with Dr. Mckeon.  This should be secured to your abdomen at all times.  This will not generally restrict your activities, but you should be careful if you are driving such that it does not become a distraction.  You should apply the bacitracin antibiotic ointment (see below) to the tip of the penis twice a day.  You may notice a little blood, small amount of white discharge, or caking at the tip of the penis with the catheter in place.  This is normal but it can irritate the tip of the penis so it is best to wash this off in the shower.    Activity:  You can return to your normal level of activity as you are able, based upon your level of pain. Walking and lifting won t hurt the urethroplasty site but use good judgment: if something hurts then don t do it.  Restrictions:  You should not drive or drink alcoholic beverages while you are taking narcotic medications (see below).  You should not soak in a tub or pool until the catheter is removed.  Your wounds and catheter may get wet and daily showering with gentle washing is important to keep your wounds clean.    Wound Care:  You will have an incision between your scrotum and anus (and one in the mouth if you had a graft done). These will be closed with stitches that will dissolve on their own and do not need to be removed.  You will not need any specific bandage on this area, but you may find wearing a small pad in your underwear can help protect from blood staining your underwear.   If you do have pads or bandages, change them whenever they become damp or soiled to keep bacteria away from your incisions.      Medications:   Oxycodone-  this is a narcotic pain medication which you should only need for two to three days after surgery.    Detrol LA (tolterodine) -  this is a bladder spasm medicine which you should take on a daily basis until your catheter has been removed.  Bacitracin Ointment -  this is an antibiotic ointment to apply to the tip of the penis 2-3 times daily to help with discomfort from the Allan cathteter  Ciprofloxacin - this is an antibiotic pill to take the morning of Allan catheter removal  Pericolace - this is a stool softener.  Please take this twice daily.  The surgery, pain medications, and bladder spasm medications can lead to constipation.  You can stop the pericolace or reduce it if you are having loose stools.  Other over the counter solutions such as prune juice, miralax, fiber products, senna, and dulcolax can also be used.  If you have not had a bowel movement in 3 days start over-the-counter Milk of Magnesia taken as directed twice daily until you have a good result.  Call the office with concerns.   Peridex - if you had a buccal graft you will also have this mouth rinse to use every two hours while you are awake until it is gone to help prevent infection and promote healing.    Follow-up:  - Your follow-up appointment in Dr. Gloria s clinic is on: 9/7/18  - You will have an X-ray and catheter removal done immediately before the clinic visit.  TAKE YOUR ANTIBIOTIC TABLET, one hour before your appointments.     Questions:  - Call or return sooner than your regularly scheduled visit if you develop any of the following:  Fever (greater than 101.3F) or flu-like symptoms, uncontrolled pain, uncontrolled nausea or vomiting, as well as increasing redness, swelling, or drainage from your wound or any concerns about your catheter drainage.  Here are some phone numbers and emails you can use to reach us:  - Aviva Mckeon, Nurse Coordinator (8A-5P, M-F): [phone]   - Nursing phone helpline at the  Urology Clinic (8A-5P M-F): [phone]  - Nights or weekends, call [phone] and ask the  to page the urology resident on call.   - For emergencies, always call [phone]         Discharge Disposition:   Discharged to home      Attestation: I have reviewed today's vital signs, notes, medications, labs and imaging.    Clementina Garcia PA-C  Urology Physician Assistant  Personal Pager: [phone]    Please call Job Code:   [phone] to reach the Urology resident or PA on call - Weekdays  [phone] to reach the Urology resident or PA on call - Weeknights and weekends    August 17, 2018

## 2018-08-17 NOTE — PLAN OF CARE
Problem: Patient Care Overview  Goal: Plan of Care/Patient Progress Review  Outcome: Adequate for Discharge Date Met: 08/17/18  VSS. Pt dc at 1100. Pt ambulated in [place]. Oxy given for pain prior to dc. Sent with yi supplies. Education materials given. Education completed with wife present. AUOP. AVS signed and printed. Scant drainage from incision, pad applied to underwear. PIV removed.

## 2018-08-17 NOTE — PROGRESS NOTES
"Urology  Progress Note    Patient reports pain with erection this morning, otherwise pain controlled   Not yet ambulating   Tolerating diet      Exam  /67 (BP Location: Right arm)  Temp 97.7  F (36.5  C) (Oral)  Resp 20  Ht 1.753 m (5' 9\")  Wt 117.6 kg (259 lb 4.2 oz)  SpO2 97%  BMI 38.29 kg/m2  No acute distress  Unlabored breathing  Abdomen soft, nontender, nondistended.   Penile ventral incision C/D/I  Urethral catheter capped. SPT to gravity with clear urine    UOP 1.0L /850    Labs  None     Assessment/Plan  48 year old y/o male POD#1 s/p cystoscopy, double buccal urethroplasty for pan-penile urethral stricture.     Neuro: Continue current regimen for pain control  CV: No acute issues   Pulm: incentive spirometry while awake  FEN/GI: Regular diet, MIVF @ 100/hr. Will discontinue once tolerating PO consistently. Peridex   Endo: No acute issues  : Continue SPT to drainage, continue urethral catheter capped. Detrol LA 4 mg daily   Heme/ID: Page-operative antibiotics completed  Activity: Up ad jamia  PPx: SCDs    Seen and examined with the chief resident. Will discuss with Dr. Gloria.    Rebecca Slaughter, PGY-4  Urology Resident     Contacting the Urology Team     Please use the following job codes to reach the Urology Team. Note that you must use an in house phone and that job codes cannot receive text pages.     On weekdays, dial [phone] (or star-star-star [phone] on the new [employer] telephones) then [phone] to reach the Adult Urology resident or PA on call    On weekdays, dial [phone] (or star-star-star [phone] on the new [employer] telephones) then [phone] to reach the Pediatric Urology resident    On weeknights and weekends, dial [phone] (or star-star-star [phone] on the new [employer] telephones) then [phone] to reach the Urology resident on call (for both Adult and Pediatrics)              "

## 2018-08-17 NOTE — PLAN OF CARE
"Problem: Patient Care Overview  Goal: Plan of Care/Patient Progress Review  Outcome: No Change  /67 (BP Location: Right arm)  Temp 97.7  F (36.5  C) (Oral)  Resp 20  Ht 1.753 m (5' 9\")  Wt 117.6 kg (259 lb 4.2 oz)  SpO2 97%  BMI 38.29 kg/m2  Patient arrived  on floor around 1900. Patients VSS. Patients pain controlled with oxy and scheduled MS cont. Patients superpubic cath with good urine output. Patient denies nausea, tolerating diet. Patient using jaw strip with ice. Patients on capnography. PIV with NS @100. Patient appears to rest between cares. Family at bedside. Cont with POC.      "

## 2018-08-20 ENCOUNTER — CARE COORDINATION (OUTPATIENT)
Dept: UROLOGY | Facility: CLINIC | Age: 48
End: 2018-08-20

## 2018-08-20 NOTE — PROGRESS NOTES
Urology Postop Phone Note:    Mr. Bakari White is a 48 year old male who underwent Complex single stage anterior urethral reconstruction of urethral stricture (12 cm) extending from meatus to distal bulbar urethra utilizing a two separate dorsal onlay of buccal mucosa grafts (unusually complex due to lichen sclerosus, involvement of the meatus, and unusually long stricture necessitating two separate oral mucosal grafts), [place] of full thickness buccal mucosa graft from the LEFT Oral cavity (2.2 cm wide x 6.5 cm long), [place] of full thickness buccal mucosa graft from the RIGHT Oral cavity (2.2 cm wide x 6.5 cm long), Cystoscopy with Suprapubic tube placement, and Abdominal ultrasound to provide safe guidance for suprapubic tube placement on 8/16/18 with Dr. Gloria for a history of Post-traumatic stricture of anterior urethra .  His postoperative course was remarkable for pain and the patient was d/c to home on 8/17/18.    Fevers/chills: Patient denies any fever or chills.  Eating/drinking: Patient is able to eat and drink without any complaints.  Bowel habits: Patient reports having a normal bowel movement.  Urine output Allan catheter Color Yellow Clarity Clear Odor None  Incisions: Patient denies any signs and symptoms of infection.  Pain: Patient reports pain as a 3 out of 10.  The pain is located at incision site in scrotum.  Narcotics: The patient has been taking oxycodone 10 mg TID and Tylenol 325 mg TID for pain medication and is able to control the pain.  Antibiotics: No    Follow up appointment scheduled on 9/7/18 at 1500 with Dr. Gloria.  [facility] at 1400.    Informed the patient of the clinic triage nursing # ([phone]) and urology resident on call # for after hours concerns.    Aviva Mckeon, RN  Care Coordinator - Reconstructive Urology

## 2018-08-31 ENCOUNTER — PRE VISIT (OUTPATIENT)
Dept: UROLOGY | Facility: CLINIC | Age: 48
End: 2018-08-31

## 2018-08-31 NOTE — TELEPHONE ENCOUNTER
Patient with history of pendulous urethral stricture, distal bulbar urethral stricture, and lichen sclerosus coming in for post operative check up S/P complex single stage anterior urethral reconstruction of urethral stricture (12 cm) extending from meatus to distal bulbar urethra utilizing a two separate dorsal onlay of buccal mucosa grafts, harvest of full thickness bilateral buccal mucosa graft, cystoscopy with suprapubic tube placement, and abdominal ultrasound to provide safe guidance for suprapubic tube placement. Patient chart reviewed, no need for call, all records available and ready for appointment.

## 2018-09-07 ENCOUNTER — OFFICE VISIT (OUTPATIENT)
Dept: UROLOGY | Facility: CLINIC | Age: 48
End: 2018-09-07
Payer: COMMERCIAL

## 2018-09-07 ENCOUNTER — RADIANT APPOINTMENT (OUTPATIENT)
Dept: GENERAL RADIOLOGY | Facility: CLINIC | Age: 48
End: 2018-09-07
Attending: UROLOGY
Payer: COMMERCIAL

## 2018-09-07 VITALS
HEART RATE: 95 BPM | WEIGHT: 263 LBS | DIASTOLIC BLOOD PRESSURE: 89 MMHG | SYSTOLIC BLOOD PRESSURE: 130 MMHG | HEIGHT: 69 IN | OXYGEN SATURATION: 96 % | BODY MASS INDEX: 38.95 KG/M2

## 2018-09-07 DIAGNOSIS — N35.919 URETHRAL STRICTURE: ICD-10-CM

## 2018-09-07 ASSESSMENT — PAIN SCALES - GENERAL: PAINLEVEL: NO PAIN (0)

## 2018-09-07 NOTE — PROGRESS NOTES
"Postop Note    Bakari White is a 48 year old male who is 3 weeks s/p panurethroplasty for 12cm meatus to distal bulbar urethral stricture - dorsal onlay double buccal.    Only a penile incision was needed.  He has minor numbness along ventral incision closure.    Postop VCUG was performed today which showed no extravasation. Nice lumen.        /89 (BP Location: Right arm, Patient Position: Right side, Cuff Size: Adult Large)  Pulse 95  Ht 1.753 m (5' 9\")  Wt 119.3 kg (263 lb)  SpO2 96%  BMI 38.84 kg/m2    Exam:  Incision clean, dry, intact  No tenderness or evidence of cellulitis  No hematoma  Buccal graft visible at meatus dorsally.  SPT draining clear urine    A/P   Excellent outcome after 12cm panurethroplasty  Suprapubic tube removed. After removal, he voided well and was happy with stream.  Return to work at 1 month postop (1 week from now)  Followup in 3 months for surveillance cystoscopy    Deep Gloria MD  Reconstructive Urology          "

## 2018-09-07 NOTE — LETTER
September 7, 2018    Bakari White  [address] [address]      To Whom It May Concern,      Mr. Bakari White is a patient under my professional care. Due to recent health events, please excuse him from work until September 14th, 2018. On this day he can return to work without any restrictions.    Thank you.    If you have any questions, please contact my clinic at [phone].        Sincerely,        Dr. Deep Gloria

## 2018-09-07 NOTE — MR AVS SNAPSHOT
After Visit Summary   9/7/2018    Bakari White    MRN: [id number]           Patient Information     Date Of Birth          1970        Visit Information        Provider Department      9/7/2018 3:00 PM Deep Gloria MD [facility] Urology and [facility] for Prostate and Urologic C[facility]        Care Instructions    Follow up with Dr. Gloria in 3 months with cystoscopy.        It was a pleasure meeting with you today.  Thank you for allowing me and my team the privilege of caring for you today.  YOU are the reason we are here, and I truly hope we provided you with the excellent service you deserve.  Please let us know if there is anything else we can do for you so that we can be sure you are leaving completely satisfied with your care experience.                  Follow-ups after your visit        Who to contact     Please call your clinic at [phone] to:    Ask questions about your health    Make or cancel appointments    Discuss your medicines    Learn about your test results    Speak to your doctor            Additional Information About Your Visit        [employer] Information     [employer] gives you secure access to your electronic health record. If you see a primary care provider, you can also send messages to your care team and make appointments. If you have questions, please call your primary care clinic.  If you do not have a primary care provider, please call [phone] and they will assist you.      [employer] is an electronic gateway that provides easy, online access to your medical records. With [employer], you can request a clinic appointment, read your test results, renew a prescription or communicate with your care team.     To access your existing account, please contact your [facility] or call [phone] for assistance.        Care EveryWhere ID     This is your Care EveryWhere ID. This could be used by other organizations to access your [facility]  "records  [id number]        Your Vitals Were     Pulse Height Pulse Oximetry BMI (Body Mass Index)          95 1.753 m (5' 9\") 96% 38.84 kg/m2         Blood Pressure from Last 3 Encounters:   09/07/18 130/89   08/17/18 122/58   07/13/18 135/87    Weight from Last 3 Encounters:   09/07/18 119.3 kg (263 lb)   08/16/18 117.6 kg (259 lb 4.2 oz)   07/13/18 117.9 kg (260 lb)              Today, you had the following     No orders found for display       Primary Care Provider Office Phone # Fax #    Milad Lozano [phone]       [facility] [address] [facility] [address]        Equal Access to Services     ROHINI VARNER : Miguel herron Sonathalie, waaxda luqadaha, qaybta kaalmada adeegyaalejandro, keli yin . So [facility] [phone].    ATENCIÓN: Si habla español, tiene a brown disposición servicios gratuitos de asistencia lingüística. Llame al [phone].    We comply with applicable federal civil rights laws and [place] laws. We do not discriminate on the basis of race, color, national origin, age, disability, sex, sexual orientation, or gender identity.            Thank you!     Thank you for choosing [facility] UROLOGY AND [facility] FOR PROSTATE AND UROLOGIC CANCERS  for your care. Our goal is always to provide you with excellent care. Hearing back from our patients is one way we can continue to improve our services. Please take a few minutes to complete the written survey that you may receive in the mail after your visit with us. Thank you!             Your Updated Medication List - Protect others around you: Learn how to safely use, store and throw away your medicines at www.disposemymeds.org.          This list is accurate as of 9/7/18  3:37 PM.  Always use your most recent med list.                   Brand Name Dispense Instructions for use Diagnosis    acetaminophen 325 MG tablet    TYLENOL    150 tablet    Take 2 tablets (650 mg) by mouth every 4 hours as needed " for mild pain    Urethral stricture, unspecified stricture type, Acute post-operative pain       albuterol 108 (90 Base) MCG/ACT inhaler    PROAIR HFA/PROVENTIL HFA/VENTOLIN HFA     Inhale 2 puffs into the lungs        augmented betamethasone dipropionate 0.05 % cream    DIPROLENE-AF          bacitracin 500 UNIT/GM Oint     28 g    Apply topically 3 times daily To urinary meatus (tip of penis)    Urethral stricture, unspecified stricture type       chlorhexidine 0.12 % solution    PERIDEX    473 mL    Swish and spit 15 mLs in mouth every 2 hours (while awake) Until solution is gone    Urethral stricture, unspecified stricture type       morphine 30 MG 12 hr tablet    MS CONTIN     Take 30 mg by mouth        oxyCODONE IR 10 MG tablet    ROXICODONE    22 tablet    Take 0.5-1 tablets (5-10 mg) by mouth every 4 hours as needed for moderate to severe pain    Urethral stricture, unspecified stricture type       senna-docusate 8.6-50 MG per tablet    SENOKOT-S;PERICOLACE    60 tablet    Take 1 tablet by mouth 2 times daily To prevent constipation    Urethral stricture, unspecified stricture type, Acute post-operative pain       sertraline 100 MG tablet    ZOLOFT     Take 150 mg by mouth daily        tiZANidine 2 MG tablet    ZANAFLEX    90 tablet    Take 1 tablet (2 mg) by mouth 3 times daily as needed ( DO NOT TAKE WHILE TAKING CIPROFLOXACIN)    Acute post-operative pain       tolterodine 4 MG 24 hr capsule    DETROL LA    21 capsule    Take 1 capsule (4 mg) by mouth daily    Urethral stricture, unspecified stricture type       traZODone 50 MG tablet    DESYREL     Take  mg by mouth

## 2018-09-07 NOTE — NURSING NOTE
Chief Complaint   Patient presents with     Surgical Followup     urethral stricture       Jeanette Pathak MA

## 2018-09-07 NOTE — PATIENT INSTRUCTIONS
Follow up with Dr. Gloria in 3 months with cystoscopy.        It was a pleasure meeting with you today.  Thank you for allowing me and my team the privilege of caring for you today.  YOU are the reason we are here, and I truly hope we provided you with the excellent service you deserve.  Please let us know if there is anything else we can do for you so that we can be sure you are leaving completely satisfied with your care experience.

## 2018-09-07 NOTE — LETTER
"9/7/2018       RE: Bakari White  [address] [facility] [address]     Dear Colleague,    Thank you for referring your patient, Bakari White, to the [facility] UROLOGY AND [facility] FOR PROSTATE AND UROLOGIC CANCERS at [facility]. Please see a copy of my visit note below.    Postop Note    Bakari White is a 48 year old male who is 3 weeks s/p panurethroplasty for 12cm meatus to distal bulbar urethral stricture - dorsal onlay double buccal.    Only a penile incision was needed.  He has minor numbness along ventral incision closure.    Postop VCUG was performed today which showed no extravasation. Nice lumen.        /89 (BP Location: Right arm, Patient Position: Right side, Cuff Size: Adult Large)  Pulse 95  Ht 1.753 m (5' 9\")  Wt 119.3 kg (263 lb)  SpO2 96%  BMI 38.84 kg/m2    Exam:  Incision clean, dry, intact  No tenderness or evidence of cellulitis  No hematoma  Buccal graft visible at meatus dorsally.  SPT draining clear urine    A/P   Excellent outcome after 12cm panurethroplasty  Suprapubic tube removed. After removal, he voided well and was happy with stream.  Return to work at 1 month postop (1 week from now)  Followup in 3 months for surveillance cystoscopy    Deep Gloria MD  Reconstructive Urology            Again, thank you for allowing me to participate in the care of your patient.      Sincerely,    Deep Gloria MD      "

## 2018-09-07 NOTE — LETTER
"9/7/2018      RE: Bakari White  [address] [facility] [address]       Postop Note    Bakari White is a 48 year old male who is 3 weeks s/p panurethroplasty for 12cm meatus to distal bulbar urethral stricture - dorsal onlay double buccal.    Only a penile incision was needed.  He has minor numbness along ventral incision closure.    Postop VCUG was performed today which showed no extravasation. Nice lumen.      /89 (BP Location: Right arm, Patient Position: Right side, Cuff Size: Adult Large)  Pulse 95  Ht 1.753 m (5' 9\")  Wt 119.3 kg (263 lb)  SpO2 96%  BMI 38.84 kg/m2    Exam:  Incision clean, dry, intact  No tenderness or evidence of cellulitis  No hematoma  Buccal graft visible at meatus dorsally.  SPT draining clear urine      A/P   Excellent outcome after 12cm panurethroplasty  Suprapubic tube removed. After removal, he voided well and was happy with stream.  Return to work at 1 month postop (1 week from now)  Followup in 3 months for surveillance cystoscopy      Deep Gloria MD  Reconstructive Urology            "

## 2018-12-03 ENCOUNTER — TELEPHONE (OUTPATIENT)
Dept: UROLOGY | Facility: CLINIC | Age: 48
End: 2018-12-03

## 2018-12-18 ENCOUNTER — OFFICE VISIT (OUTPATIENT)
Dept: UROLOGY | Facility: CLINIC | Age: 48
End: 2018-12-18
Payer: COMMERCIAL

## 2018-12-18 VITALS
HEART RATE: 85 BPM | OXYGEN SATURATION: 96 % | SYSTOLIC BLOOD PRESSURE: 141 MMHG | BODY MASS INDEX: 39.43 KG/M2 | DIASTOLIC BLOOD PRESSURE: 97 MMHG | WEIGHT: 267 LBS

## 2018-12-18 DIAGNOSIS — N35.819 OTHER STRICTURE OF URETHRA IN MALE: Primary | ICD-10-CM

## 2018-12-18 NOTE — LETTER
12/18/2018       RE: Bakari White  [address] [facility] [address]     Dear Colleague,    Thank you for referring your patient, Bakari White, to the [facility] UROLOGY AND INST FOR PROSTATE AND UROLOGIC CANCERS at [facility]. Please see a copy of my visit note below.    Urethroplasty Follow-up Visit with Surveillance Urethroscopy    PRE-PROCEDURE DIAGNOSIS: History of urethral stricture  POST-PROCEDURE DIAGNOSIS: No evidence of urethral stricture   PROCEDURE: Urethroscopy    HISTORY: Bakari White is a 48 year old man 4 months status-post double buccal graft dorsal onlay urethroplasty for panurethral urethral stricture - meatus to distal bulbar urethra    BMG complaints: No  Positioning complaints: No  Perineal / Genital complaints: no  Urinary incontinence: No  Questionnaires reviewed. See flowsheet for details.    He is very happy with the result. His only complaint is a slight ventral curvature (Chordee) with erection    REVIEW OF OFFICE STUDIES:      Uroflow: QMax: 7.3 mL/s - bell shaped  PVR: 0  Volume 47mL    DESCRIPTION OF PROCEDURE:  After informed consent was obtained, the patient was brought to the procedure room where he was placed in the supine position with all pressure points well padded.  He was prepped and draped in a sterile fashion. A flexible cystoscope was introduced through a well-lubricated urethra.  The anterior urethra up to the point of reconstruction was diffusely somewhat narrowed. The buccal graft took well on the dorsal plate. At the site of reconstruction there was not evidence of stricture recurrence. The narrowest caliber of the urethra at the reconstruction was estimated to be 18F and this was located just proximal to our urethroplasty (mid bulb). The flexible cystoscope passed easily. The voluntary sphincter was identified and the scope withdrawn.    ASSESSMENT AND PLAN:  Excellent outcome after urethroplasty. The patient will follow-up in 9  months with with uroflowmetry, post-void residual urine volume measurement, Urethroplasty PROM, RAMIREZ, MSHQ, and CLSS and post-op questionnaires. Cystoscopy will be needed. If symptoms recur cystoscopy will be done sooner.    Deep Gloria MD

## 2018-12-18 NOTE — PROGRESS NOTES
Urethroplasty Follow-up Visit with Surveillance Urethroscopy    PRE-PROCEDURE DIAGNOSIS: History of urethral stricture  POST-PROCEDURE DIAGNOSIS: No evidence of urethral stricture   PROCEDURE: Urethroscopy    HISTORY: Bakari White is a 48 year old man 4 months status-post double buccal graft dorsal onlay urethroplasty for panurethral urethral stricture - meatus to distal bulbar urethra    BMG complaints: No  Positioning complaints: No  Perineal / Genital complaints: no  Urinary incontinence: No  Questionnaires reviewed. See flowsheet for details.    He is very happy with the result. His only complaint is a slight ventral curvature (Chordee) with erection    REVIEW OF OFFICE STUDIES:      Uroflow: QMax: 7.3 mL/s - bell shaped  PVR: 0  Volume 47mL    DESCRIPTION OF PROCEDURE:  After informed consent was obtained, the patient was brought to the procedure room where he was placed in the supine position with all pressure points well padded.  He was prepped and draped in a sterile fashion. A flexible cystoscope was introduced through a well-lubricated urethra.  The anterior urethra up to the point of reconstruction was diffusely somewhat narrowed. The buccal graft took well on the dorsal plate. At the site of reconstruction there was not evidence of stricture recurrence. The narrowest caliber of the urethra at the reconstruction was estimated to be 18F and this was located just proximal to our urethroplasty (mid bulb). The flexible cystoscope passed easily. The voluntary sphincter was identified and the scope withdrawn.    ASSESSMENT AND PLAN:  Excellent outcome after urethroplasty. The patient will follow-up in 9 months with with uroflowmetry, post-void residual urine volume measurement, Urethroplasty PROM, RAMIREZ, MSHQ, and CLSS and post-op questionnaires. Cystoscopy will be needed. If symptoms recur cystoscopy will be done sooner.    Deep Gloria MD

## 2018-12-18 NOTE — PATIENT INSTRUCTIONS
"RTC in 8 mos for follow-up with Cystoscopy  AFTER YOUR CYSTOSCOPY        You have just completed a cystoscopy, or \"cysto\", which allowed your physician to learn more about your bladder (or to remove a stent placed after surgery). We suggest that you continue to avoid caffeine, fruit juice, and alcohol for the next 24 hours, however, you are encouraged to return to your normal activities.         A few things that are considered normal after your cystoscopy:     * Small amount of bleeding (or spotting) that clears within the next 24 hours     * Slight burning sensation with urination     * Sensation to of needing to avoid more frequently     * The feeling of \"air\" in your urine     * Mild discomfort that is relieved with Tylenol        Please contact our office promptly if you:     * Develop a fever above 101 degrees     * Are unable to urinate     * Develop bright red blood that does not stop     * Severe pain or swelling         Please contact our office with any concerns or questions @DEPHN.  "

## 2019-11-16 NOTE — NURSING NOTE
"Chief Complaint   Patient presents with     Consult     meatal stenosis       Blood pressure 135/87, pulse 73, height 1.753 m (5' 9\"), weight 117.9 kg (260 lb). Body mass index is 38.4 kg/(m^2).    There is no problem list on file for this patient.      Allergies   Allergen Reactions     Etodolac      STRANGE DREAMS       Current Outpatient Prescriptions   Medication Sig Dispense Refill     albuterol (PROAIR HFA/PROVENTIL HFA/VENTOLIN HFA) 108 (90 Base) MCG/ACT Inhaler Inhale 2 puffs into the lungs       augmented betamethasone dipropionate (DIPROLENE-AF) 0.05 % cream        morphine (MS CONTIN) 30 MG 12 hr tablet Take 30 mg by mouth       sertraline (ZOLOFT) 100 MG tablet Take 150 mg by mouth       tiZANidine (ZANAFLEX) 4 MG tablet Take 2 tablets as needed during the day and 1 tablet at bedtime       traZODone (DESYREL) 50 MG tablet Take  mg by mouth         Social History   Substance Use Topics     Smoking status: Current Every Day Smoker     Smokeless tobacco: Never Used     Alcohol use Not on file       Zenia Mckeon LPN  7/13/2018  11:44 AM    " Dementia

## 2020-03-11 ENCOUNTER — HEALTH MAINTENANCE LETTER (OUTPATIENT)
Age: 50
End: 2020-03-11

## 2021-01-03 ENCOUNTER — HEALTH MAINTENANCE LETTER (OUTPATIENT)
Age: 51
End: 2021-01-03

## 2021-04-25 ENCOUNTER — HEALTH MAINTENANCE LETTER (OUTPATIENT)
Age: 51
End: 2021-04-25

## 2021-10-10 ENCOUNTER — HEALTH MAINTENANCE LETTER (OUTPATIENT)
Age: 51
End: 2021-10-10

## 2022-05-21 ENCOUNTER — HEALTH MAINTENANCE LETTER (OUTPATIENT)
Age: 52
End: 2022-05-21

## 2022-09-18 ENCOUNTER — HEALTH MAINTENANCE LETTER (OUTPATIENT)
Age: 52
End: 2022-09-18

## 2023-06-04 ENCOUNTER — HEALTH MAINTENANCE LETTER (OUTPATIENT)
Age: 53
End: 2023-06-04

## (undated) DEVICE — SU ETHILON 2-0 FS 18" 664H

## (undated) DEVICE — VESSEL LOOPS YELLOW MAXI 31145694

## (undated) DEVICE — VESSEL LOOPS BLUE SUPERMAXI 011022PBX

## (undated) DEVICE — ENDO SEAL BX PORT BPS-A

## (undated) DEVICE — TUBING SUCTION 10'X3/16" N510

## (undated) DEVICE — Device

## (undated) DEVICE — CATH INTRODUCER SUPRAPUBIC 16FR SF-S16-851

## (undated) DEVICE — DRSG JAWBRA  95

## (undated) DEVICE — COVER ULTRASOUND PROBE W/GEL FLEXI-FEEL 6"X58" LF  25-FF658

## (undated) DEVICE — SOL NACL 0.9% IRRIG 1000ML BOTTLE 2F7124

## (undated) DEVICE — CONNECTOR WATER VALVE PERFUSION PACK STR 020272801

## (undated) DEVICE — PACK GOWN 3/PK DISP XL SBA32GPFCB

## (undated) DEVICE — PREP POVIDONE IODINE SOLUTION 10% 4OZ

## (undated) DEVICE — NDL COUNTER 20CT 31142493

## (undated) DEVICE — SPONGE RAY-TEC 4X8" 7318

## (undated) DEVICE — TUBING IRRIG CYSTO/BLADDER SET 81" LF 2C4040

## (undated) DEVICE — NDL 25GA 5/8" 305122

## (undated) DEVICE — BNDG KLING 2" 2231

## (undated) DEVICE — SUCTION MANIFOLD DORNOCH ULTRA CART UL-CL500

## (undated) DEVICE — SOL NACL 0.9% INJ 1000ML BAG 07983-09

## (undated) DEVICE — DRAPE SHEET REV FOLD 3/4 9349

## (undated) DEVICE — SU PDS II 5-0 RB-1 DA 30" Z320H

## (undated) DEVICE — TOOTHBRUSH ADULT NON STERILE MDS136850

## (undated) DEVICE — DRAPE LEGGINGS CLEAR 8430

## (undated) DEVICE — CUP AND LID 2PK 2OZ STERILE  SSK9006A

## (undated) DEVICE — PREP BALL KERLIX ROUND LATEX

## (undated) DEVICE — SU SILK 2-0 SH CR 5X18" C0125

## (undated) DEVICE — SYR 10ML FINGER CONTROL W/O NDL 309695

## (undated) DEVICE — EYE PREP BETADINE 5% SOLUTION 30ML 0065-0411-30

## (undated) DEVICE — SOL WATER IRRIG 1000ML BOTTLE 07139-09

## (undated) DEVICE — BLADE CLIPPER SGL USE 9680

## (undated) DEVICE — SU PDS II 5-0 RB-1DA 30" Z320H

## (undated) DEVICE — PACK SET-UP STD 9102

## (undated) DEVICE — SUTURE BOOTS 051003PBX

## (undated) DEVICE — ESU CORD BIPOLAR GREEN 10-4000

## (undated) DEVICE — PAD CHUX UNDERPAD 23X24" 7136

## (undated) DEVICE — PREP POVIDONE IODINE SCRUB 7.5% 120ML

## (undated) DEVICE — LINEN TOWEL PACK X6 WHITE 5487

## (undated) DEVICE — LINEN GOWN XLG 5407

## (undated) DEVICE — LINEN TOWEL PACK X30 5481

## (undated) DEVICE — SPONGE LAP 18X18" X8435

## (undated) DEVICE — SU MONOCRYL 4-0 RB-1 27" Y214H

## (undated) DEVICE — CATH FOLYSIL 16FR 15ML AA6116

## (undated) DEVICE — JELLY LUBRICATING SURGILUBE 2OZ TUBE

## (undated) DEVICE — GUIDEWIRE SENSOR DUAL FLEX STR 0.035"X150CM M0066703080

## (undated) DEVICE — SU VICRYL 4-0 RB-1 27" J304

## (undated) DEVICE — CATH PLUG W/CAP 000076

## (undated) DEVICE — SU VICRYL 2-0 SH 27" UND J417H

## (undated) DEVICE — DRAPE POUCH INSTRUMENT 1018

## (undated) DEVICE — LABEL MEDICATION SYSTEM 3303-P

## (undated) DEVICE — BLADE KNIFE SURG 15 371115

## (undated) DEVICE — SU CHROMIC 4-0 RB-1 27" U203H

## (undated) DEVICE — SU PDS II 5-0 RB-1 27" Z303H

## (undated) DEVICE — SYR BULB IRRIG 50ML LATEX FREE 0035280

## (undated) DEVICE — PACK NEURO MINOR UMMC SNE32MNMU4

## (undated) DEVICE — ESU GROUND PAD ADULT W/CORD E7507

## (undated) DEVICE — BAG URINARY DRAIN LUBRISIL IC 4000ML LF 253509A

## (undated) DEVICE — PREP SKIN SCRUB TRAY 4461A

## (undated) DEVICE — SUCTION TIP YANKAUER STR K87

## (undated) DEVICE — SU VICRYL 3-0 SH 27" UND J416H

## (undated) DEVICE — DRAPE GYN/UROLOGY FLUID POUCH TUR 29455

## (undated) RX ORDER — HYDROMORPHONE HYDROCHLORIDE 1 MG/ML
INJECTION, SOLUTION INTRAMUSCULAR; INTRAVENOUS; SUBCUTANEOUS
Status: DISPENSED
Start: 2018-08-16

## (undated) RX ORDER — PROPOFOL 10 MG/ML
INJECTION, EMULSION INTRAVENOUS
Status: DISPENSED
Start: 2018-08-16

## (undated) RX ORDER — BUPIVACAINE HYDROCHLORIDE 2.5 MG/ML
INJECTION, SOLUTION EPIDURAL; INFILTRATION; INTRACAUDAL
Status: DISPENSED
Start: 2018-08-16

## (undated) RX ORDER — BUPIVACAINE HYDROCHLORIDE AND EPINEPHRINE 5; 5 MG/ML; UG/ML
INJECTION, SOLUTION EPIDURAL; INTRACAUDAL; PERINEURAL
Status: DISPENSED
Start: 2018-08-16

## (undated) RX ORDER — ONDANSETRON 2 MG/ML
INJECTION INTRAMUSCULAR; INTRAVENOUS
Status: DISPENSED
Start: 2018-08-16

## (undated) RX ORDER — ACETAMINOPHEN 325 MG/1
TABLET ORAL
Status: DISPENSED
Start: 2018-08-16

## (undated) RX ORDER — GABAPENTIN 300 MG/1
CAPSULE ORAL
Status: DISPENSED
Start: 2018-08-16

## (undated) RX ORDER — CHLORHEXIDINE GLUCONATE ORAL RINSE 1.2 MG/ML
SOLUTION DENTAL
Status: DISPENSED
Start: 2018-08-16

## (undated) RX ORDER — OXYCODONE HYDROCHLORIDE 5 MG/1
TABLET ORAL
Status: DISPENSED
Start: 2018-08-16

## (undated) RX ORDER — EPHEDRINE SULFATE 50 MG/ML
INJECTION, SOLUTION INTRAMUSCULAR; INTRAVENOUS; SUBCUTANEOUS
Status: DISPENSED
Start: 2018-08-16

## (undated) RX ORDER — SODIUM CHLORIDE 9 MG/ML
INJECTION, SOLUTION INTRAVENOUS
Status: DISPENSED
Start: 2018-08-16

## (undated) RX ORDER — FENTANYL CITRATE 50 UG/ML
INJECTION, SOLUTION INTRAMUSCULAR; INTRAVENOUS
Status: DISPENSED
Start: 2018-08-16

## (undated) RX ORDER — DEXAMETHASONE SODIUM PHOSPHATE 4 MG/ML
INJECTION, SOLUTION INTRA-ARTICULAR; INTRALESIONAL; INTRAMUSCULAR; INTRAVENOUS; SOFT TISSUE
Status: DISPENSED
Start: 2018-08-16

## (undated) RX ORDER — PHENYLEPHRINE HCL IN 0.9% NACL 1 MG/10 ML
SYRINGE (ML) INTRAVENOUS
Status: DISPENSED
Start: 2018-08-16